# Patient Record
Sex: FEMALE | Race: WHITE | NOT HISPANIC OR LATINO | Employment: UNEMPLOYED | URBAN - NONMETROPOLITAN AREA
[De-identification: names, ages, dates, MRNs, and addresses within clinical notes are randomized per-mention and may not be internally consistent; named-entity substitution may affect disease eponyms.]

---

## 2022-03-30 ENCOUNTER — INITIAL PRENATAL (OUTPATIENT)
Dept: OBSTETRICS AND GYNECOLOGY | Facility: CLINIC | Age: 22
End: 2022-03-30

## 2022-03-30 ENCOUNTER — LAB (OUTPATIENT)
Dept: LAB | Facility: HOSPITAL | Age: 22
End: 2022-03-30

## 2022-03-30 VITALS
DIASTOLIC BLOOD PRESSURE: 88 MMHG | HEIGHT: 68 IN | BODY MASS INDEX: 32.43 KG/M2 | SYSTOLIC BLOOD PRESSURE: 140 MMHG | WEIGHT: 214 LBS

## 2022-03-30 DIAGNOSIS — O09.299 HISTORY OF MISCARRIAGE, CURRENTLY PREGNANT, UNSPECIFIED TRIMESTER: Primary | ICD-10-CM

## 2022-03-30 DIAGNOSIS — E66.9 OBESITY (BMI 30-39.9): ICD-10-CM

## 2022-03-30 DIAGNOSIS — Z32.00 PREGNANCY EXAMINATION OR TEST, PREGNANCY UNCONFIRMED: ICD-10-CM

## 2022-03-30 LAB
ABO GROUP BLD: NORMAL
AMPHET+METHAMPHET UR QL: NEGATIVE
AMPHETAMINES UR QL: NEGATIVE
B-HCG UR QL: POSITIVE
BARBITURATES UR QL SCN: NEGATIVE
BASOPHILS # BLD AUTO: 0.02 10*3/MM3 (ref 0–0.2)
BASOPHILS NFR BLD AUTO: 0.2 % (ref 0–1.5)
BENZODIAZ UR QL SCN: NEGATIVE
BILIRUB UR QL STRIP: NEGATIVE
BLD GP AB SCN SERPL QL: NEGATIVE
BUPRENORPHINE SERPL-MCNC: NEGATIVE NG/ML
CANNABINOIDS SERPL QL: NEGATIVE
CLARITY UR: CLEAR
COCAINE UR QL: NEGATIVE
COLOR UR: YELLOW
DEPRECATED RDW RBC AUTO: 39.8 FL (ref 37–54)
EOSINOPHIL # BLD AUTO: 0.18 10*3/MM3 (ref 0–0.4)
EOSINOPHIL NFR BLD AUTO: 1.8 % (ref 0.3–6.2)
ERYTHROCYTE [DISTWIDTH] IN BLOOD BY AUTOMATED COUNT: 12.9 % (ref 12.3–15.4)
EXPIRATION DATE: ABNORMAL
GLUCOSE UR STRIP-MCNC: NEGATIVE MG/DL
HBV SURFACE AG SERPL QL IA: NORMAL
HCG INTACT+B SERPL-ACNC: 2741 MIU/ML
HCT VFR BLD AUTO: 40.3 % (ref 34–46.6)
HCV AB SER DONR QL: NORMAL
HGB BLD-MCNC: 14 G/DL (ref 12–15.9)
HGB UR QL STRIP.AUTO: NEGATIVE
HIV1+2 AB SER QL: NORMAL
IMM GRANULOCYTES # BLD AUTO: 0.02 10*3/MM3 (ref 0–0.05)
IMM GRANULOCYTES NFR BLD AUTO: 0.2 % (ref 0–0.5)
INTERNAL NEGATIVE CONTROL: NEGATIVE
INTERNAL POSITIVE CONTROL: POSITIVE
KETONES UR QL STRIP: NEGATIVE
LEUKOCYTE ESTERASE UR QL STRIP.AUTO: ABNORMAL
LYMPHOCYTES # BLD AUTO: 2.43 10*3/MM3 (ref 0.7–3.1)
LYMPHOCYTES NFR BLD AUTO: 24.6 % (ref 19.6–45.3)
Lab: ABNORMAL
Lab: NORMAL
MCH RBC QN AUTO: 29.9 PG (ref 26.6–33)
MCHC RBC AUTO-ENTMCNC: 34.7 G/DL (ref 31.5–35.7)
MCV RBC AUTO: 85.9 FL (ref 79–97)
METHADONE UR QL SCN: NEGATIVE
MONOCYTES # BLD AUTO: 0.64 10*3/MM3 (ref 0.1–0.9)
MONOCYTES NFR BLD AUTO: 6.5 % (ref 5–12)
NEUTROPHILS NFR BLD AUTO: 6.58 10*3/MM3 (ref 1.7–7)
NEUTROPHILS NFR BLD AUTO: 66.7 % (ref 42.7–76)
NITRITE UR QL STRIP: NEGATIVE
NRBC BLD AUTO-RTO: 0 /100 WBC (ref 0–0.2)
OPIATES UR QL: NEGATIVE
OXYCODONE UR QL SCN: NEGATIVE
PCP UR QL SCN: NEGATIVE
PH UR STRIP.AUTO: 7.5 [PH] (ref 5–8)
PLATELET # BLD AUTO: 296 10*3/MM3 (ref 140–450)
PMV BLD AUTO: 10.7 FL (ref 6–12)
PROPOXYPH UR QL: NEGATIVE
PROT UR QL STRIP: NEGATIVE
RBC # BLD AUTO: 4.69 10*6/MM3 (ref 3.77–5.28)
RH BLD: POSITIVE
RPR SER QL: NORMAL
SP GR UR STRIP: 1.01 (ref 1–1.03)
TRICYCLICS UR QL SCN: NEGATIVE
UROBILINOGEN UR QL STRIP: ABNORMAL
WBC NRBC COR # BLD: 9.87 10*3/MM3 (ref 3.4–10.8)

## 2022-03-30 PROCEDURE — 87591 N.GONORRHOEAE DNA AMP PROB: CPT | Performed by: NURSE PRACTITIONER

## 2022-03-30 PROCEDURE — 87661 TRICHOMONAS VAGINALIS AMPLIF: CPT | Performed by: NURSE PRACTITIONER

## 2022-03-30 PROCEDURE — 81025 URINE PREGNANCY TEST: CPT | Performed by: NURSE PRACTITIONER

## 2022-03-30 PROCEDURE — 86803 HEPATITIS C AB TEST: CPT | Performed by: NURSE PRACTITIONER

## 2022-03-30 PROCEDURE — 87491 CHLMYD TRACH DNA AMP PROBE: CPT | Performed by: NURSE PRACTITIONER

## 2022-03-30 PROCEDURE — 87086 URINE CULTURE/COLONY COUNT: CPT | Performed by: NURSE PRACTITIONER

## 2022-03-30 PROCEDURE — 80081 OBSTETRIC PANEL INC HIV TSTG: CPT | Performed by: NURSE PRACTITIONER

## 2022-03-30 PROCEDURE — 36415 COLL VENOUS BLD VENIPUNCTURE: CPT

## 2022-03-30 PROCEDURE — 80306 DRUG TEST PRSMV INSTRMNT: CPT | Performed by: NURSE PRACTITIONER

## 2022-03-30 PROCEDURE — 81003 URINALYSIS AUTO W/O SCOPE: CPT | Performed by: NURSE PRACTITIONER

## 2022-03-30 PROCEDURE — 84702 CHORIONIC GONADOTROPIN TEST: CPT | Performed by: NURSE PRACTITIONER

## 2022-03-30 NOTE — PROGRESS NOTES
"I spent approximately 60 minutes with the patient acquiring the health and history intake, reviewing prior records, discussing topics related to healthy pregnancy, entering orders, and documentation. Her UPT in office today is positive. Her LMP is approximately 2/6/22. This is her 3rd pregnancy. She had a miscarriage in July 2021 at about 7 weeks gestation. She had another miscarriage in November 2021 at about 6 weeks gestation. She was seen for both pregnancies at [facility] in [place]. The patient signed a release because she wants us to have her records from [facility].   She has history of anxiety, but she is not on medication. She is nervous today because of her history of miscarriages. She filled out the depression screening questionnaire and scored 12. However, when I talked to the patient on the phone, she said she did not answer the questions for how she felt for the past 7 days. She answered the questions as \"had she ever had those feelings\". She will do another depression screening questionnaire at her next appointment. She has Factor V Leiden. Her mother and MGF also has Factor V. Her sister was born with a club foot.   A newob bag is given. The 1st trimester teaching was done with the patient. We discussed a healthy diet and exercise and what is recommended. She plans to buy prenatal vitamins OTC. We also discussed Listeriosis and Toxoplasmosis and what fish to avoid due to high mercury levels. I informed patient not to be in hot tubs, saunas, or tanning beds. We discussed that spotting may occur after intercourse which is common, but if heavy bleeding like a period occurs to [facility] or [facility] if clinic is closed.  I encouraged her to make an appointment with the dentist if she has not had a dental exam and cleaning in the last 6 months. The patient denies use of alcohol, illicit drug use, and tobacco smoking. She plans to breastfeed.  I gave her pamphlet on breastfeeding classes and " the breastfeeding mothers support group. These services are provided by Krys Berger, Lactation Consultant. I encouraged the patient to get the TDAP vaccine in the 3rd trimester. She tells me that she did not receive childhood vaccinations. Her mother did not have her children vaccinated. I discussed with the patient that a pediatrician needs to be chosen prior to delivery for the infant to have an appointment scheduled before leaving the hospital.  I discussed lab tests will be done today. All questions were answered at this time. Due to her history of the 2 miscarriages, the patient wants to wait until about 12 weeks gestation for her to get an ultrasound, so that she can do the ultrasound transabdominally. She says I know they told me that the ultrasounds did not cause the miscarriages, but I am still scared to do it and would like to wait. I spoke to the OB provider who said to make her an appointment with an OB provider in about 2 weeks and an ultrasound will be done at about 12 weeks gestation. The patient wishes to see Dr. Adams in [place]. She is scheduled to see Dr. Adams on 4/14/22.

## 2022-03-31 LAB
BACTERIA SPEC AEROBE CULT: NO GROWTH
C TRACH RRNA CVX QL NAA+PROBE: NEGATIVE
N GONORRHOEA RRNA SPEC QL NAA+PROBE: NEGATIVE
RUBV IGG SERPL IA-ACNC: 5.13 INDEX
TRICHOMONAS VAGINALIS PCR: NEGATIVE

## 2022-04-14 ENCOUNTER — LAB (OUTPATIENT)
Dept: LAB | Facility: OTHER | Age: 22
End: 2022-04-14

## 2022-04-14 ENCOUNTER — REFERRAL TRIAGE (OUTPATIENT)
Dept: LABOR AND DELIVERY | Facility: HOSPITAL | Age: 22
End: 2022-04-14

## 2022-04-14 ENCOUNTER — INITIAL PRENATAL (OUTPATIENT)
Dept: OBSTETRICS AND GYNECOLOGY | Facility: CLINIC | Age: 22
End: 2022-04-14

## 2022-04-14 DIAGNOSIS — Z15.89 MTHFR MUTATION: ICD-10-CM

## 2022-04-14 DIAGNOSIS — O26.20 PREGNANCY AFFECTED BY PREVIOUS RECURRENT MISCARRIAGES, ANTEPARTUM: ICD-10-CM

## 2022-04-14 DIAGNOSIS — O20.9 BLEEDING IN EARLY PREGNANCY: ICD-10-CM

## 2022-04-14 DIAGNOSIS — O20.9 BLEEDING IN EARLY PREGNANCY: Primary | ICD-10-CM

## 2022-04-14 DIAGNOSIS — N96 HISTORY OF RECURRENT MISCARRIAGES: ICD-10-CM

## 2022-04-14 LAB — HCG INTACT+B SERPL-ACNC: 416.6 MIU/ML

## 2022-04-14 PROCEDURE — 36415 COLL VENOUS BLD VENIPUNCTURE: CPT | Performed by: OBSTETRICS & GYNECOLOGY

## 2022-04-14 PROCEDURE — 0501F PRENATAL FLOW SHEET: CPT | Performed by: OBSTETRICS & GYNECOLOGY

## 2022-04-14 PROCEDURE — 84702 CHORIONIC GONADOTROPIN TEST: CPT | Performed by: OBSTETRICS & GYNECOLOGY

## 2022-04-14 NOTE — PROGRESS NOTES
[facility]  Obstetrics  Date of Service: 2022    CHIEF COMPLAINT:  New prenatal visit    HISTORY OF PRESENT ILLNESS:  Marsha Damon is a 22 y.o. y/o  at 9w4d by LMP (Patient's last menstrual period was 2022 (approximate).).  She presents with bleeding that started on  while she was on vacation in [place]; the first few days she had heavy clots and felt like she had a miscarriage.  She did not have an early ultrasound because of her history of miscarriage.  She is no longer bleeding.  Denies breast tenderness or nausea.    She has had 2 prior miscarriages and this felt very similar to that.  She states that the first occurred the evening of her 1st ultrasound and the 2nd miscarriage occurred the night after an ultrasound that showed a heartbeat.  She was never been worked up for preventable causes of miscarriage.    REVIEW OF SYSTEMS  Review of Systems   Constitutional: Negative.    HENT: Negative.    Eyes: Negative.    Respiratory: Negative.    Cardiovascular: Negative.    Gastrointestinal: Negative.    Endocrine: Negative.    Genitourinary: Negative.    Musculoskeletal: Negative.    Allergic/Immunologic: Negative.    Neurological: Negative.    Hematological: Negative.    Psychiatric/Behavioral: Negative.      OBSTETRIC HISTORY:  OB History    Para Term  AB Living   3       2     SAB IAB Ectopic Molar Multiple Live Births   2                # Outcome Date GA Lbr Larry/2nd Weight Sex Delivery Anes PTL Lv   3 Current            2 SAB 2021 6w0d          1 2021 7w0d            GYN HISTORY:  Menarche: age 14  Denies h/o sexually transmitted infections/pelvic inflammatory disease  Denies h/o gynecologic surgeries, including biopsies of the cervix    PAST MEDICAL HISTORY:  Past Medical History:   Diagnosis Date   • Anxiety    • Factor V Leiden (HCC)      PAST SURGICAL HISTORY:  History reviewed. No pertinent surgical history.    FAMILY HISTORY:  Family  History   Problem Relation Age of Onset   • Hypertension Mother    • Factor V Leiden deficiency Mother    • No Known Problems Brother    • Cancer Maternal Grandmother    • Factor V Leiden deficiency Maternal Grandfather    • Cancer Maternal Grandfather    • Skin cancer Paternal Grandfather      SOCIAL HISTORY:  Social History     Socioeconomic History   • Marital status:    Tobacco Use   • Smoking status: Never Smoker   • Smokeless tobacco: Never Used   Vaping Use   • Vaping Use: Former   Substance and Sexual Activity   • Alcohol use: Never   • Drug use: Never   • Sexual activity: Yes     Partners: Male     GENETIC SCREENING:  Age >36 yo as of DARIO: no  Thalassemia: no  NTD: no  CHD: no  Down Syndrome/MR/Fragile X/Autism: no  Ashkenazi Amish with Angel-Sachs, [place], familial dysautonomia: no  Sickle cell disease or trait: no  Hemophilia: no  Muscular dystrophy: no  Cystic fibrosis: no  [place]'s chorea: no  Birth defects: no  Genetic/chromosomal disorders: no    INFECTION HISTORY:  TB exposure: no  HSV: no  Illness since LMP: no  Prior GBS infected child: N/A  STIs: no    ALLERGIES:  No Known Allergies    MEDICATIONS:  Prior to Admission medications    Not on File     PHYSICAL EXAM:   /76   Wt 96.5 kg (212 lb 12.8 oz)   LMP 02/06/2022 (Approximate)   BMI 32.36 kg/m²   General: Alert, healthy, no distress, well nourished and well developed.  Neurologic: Alert, oriented to person, place, and time.  Gait normal.  Cranial nerves II-XII grossly intact.  HEENT: Normocephalic, atraumatic.  Extraocular muscles intact, pupils equal and reactive x2.    Teeth: Normal hygiene.  Neck: Supple, no adenopathy, thyroid normal size, non-tender, without nodularity, trachea midline.  Lungs: Normal respiratory effort.  Clear to auscultation bilaterally.  No wheezes, rhonci, or rales.  Heart: Regular rate and rhythm.  No murmer, rub or gallop.  Abdomen: Soft, non-tender, non-distended,no masses, no hepatosplenomegaly,  no hernia.  Skin: No rash, no lesions.  Extremities: No cyanosis, clubbing or edema.    IMPRESSION:  Marsha Damon is a 22 y.o.  at 9w4d with suspected miscarriage    PLAN:  - Will obtain beta-hCG today   - I had a long discussion with her regarding her prior miscarriages.  I reassured her that early miscarriages are typically linked to genetic abnormalities (trisomies) that are not conducive for life which is why the body will miscarry.  I also discussed that there are acquired thrombophilia disorders (such as antiphospholipid antibody syndrome) or structural defects of the uterus that could cause recurrent miscarriages.  Will work-up after monitoring for miscarriage and following labs.     Diagnosis Plan   1. Bleeding in early pregnancy  hCG, Quantitative, Pregnancy   2. Pregnancy affected by previous recurrent miscarriages, antepartum       Sara Adams MD  2022  13:59 CDT

## 2022-04-22 ENCOUNTER — DOCUMENTATION (OUTPATIENT)
Dept: OBSTETRICS AND GYNECOLOGY | Facility: CLINIC | Age: 22
End: 2022-04-22

## 2022-04-22 NOTE — PROGRESS NOTES
Received outside records.    12/16/2020: Factor V Leiden heterozygous mutation [id number]  12/2021:  Anticardiolipin Ab IgG negative  Anticardiolipin IgM negative  Anticardiolipin IgA negative

## 2022-04-25 DIAGNOSIS — N96 HISTORY OF RECURRENT MISCARRIAGES: Primary | ICD-10-CM

## 2022-04-27 ENCOUNTER — LAB (OUTPATIENT)
Dept: LAB | Facility: OTHER | Age: 22
End: 2022-04-27

## 2022-04-27 DIAGNOSIS — N96 HISTORY OF RECURRENT MISCARRIAGES: ICD-10-CM

## 2022-04-27 LAB
HBA1C MFR BLD: 4.7 % (ref 4.8–5.6)
HCG INTACT+B SERPL-ACNC: 1.28 MIU/ML
TSH SERPL DL<=0.05 MIU/L-ACNC: 1.41 UIU/ML (ref 0.27–4.2)

## 2022-04-27 PROCEDURE — 85732 THROMBOPLASTIN TIME PARTIAL: CPT | Performed by: OBSTETRICS & GYNECOLOGY

## 2022-04-27 PROCEDURE — 84443 ASSAY THYROID STIM HORMONE: CPT | Performed by: OBSTETRICS & GYNECOLOGY

## 2022-04-27 PROCEDURE — 86146 BETA-2 GLYCOPROTEIN ANTIBODY: CPT | Performed by: OBSTETRICS & GYNECOLOGY

## 2022-04-27 PROCEDURE — 86147 CARDIOLIPIN ANTIBODY EA IG: CPT | Performed by: OBSTETRICS & GYNECOLOGY

## 2022-04-27 PROCEDURE — 85598 HEXAGNAL PHOSPH PLTLT NEUTRL: CPT | Performed by: OBSTETRICS & GYNECOLOGY

## 2022-04-27 PROCEDURE — 85613 RUSSELL VIPER VENOM DILUTED: CPT | Performed by: OBSTETRICS & GYNECOLOGY

## 2022-04-27 PROCEDURE — 84702 CHORIONIC GONADOTROPIN TEST: CPT | Performed by: OBSTETRICS & GYNECOLOGY

## 2022-04-27 PROCEDURE — 85597 PHOSPHOLIPID PLTLT NEUTRALIZ: CPT | Performed by: OBSTETRICS & GYNECOLOGY

## 2022-04-27 PROCEDURE — 85610 PROTHROMBIN TIME: CPT | Performed by: OBSTETRICS & GYNECOLOGY

## 2022-04-27 PROCEDURE — 81291 MTHFR GENE: CPT | Performed by: OBSTETRICS & GYNECOLOGY

## 2022-04-27 PROCEDURE — 85670 THROMBIN TIME PLASMA: CPT | Performed by: OBSTETRICS & GYNECOLOGY

## 2022-04-27 PROCEDURE — 85730 THROMBOPLASTIN TIME PARTIAL: CPT | Performed by: OBSTETRICS & GYNECOLOGY

## 2022-04-27 PROCEDURE — 36415 COLL VENOUS BLD VENIPUNCTURE: CPT | Performed by: OBSTETRICS & GYNECOLOGY

## 2022-04-27 PROCEDURE — 83036 HEMOGLOBIN GLYCOSYLATED A1C: CPT | Performed by: OBSTETRICS & GYNECOLOGY

## 2022-05-06 LAB — MTHFR GENE MUT ANL BLD/T: NORMAL

## 2022-05-09 VITALS — SYSTOLIC BLOOD PRESSURE: 134 MMHG | WEIGHT: 212.8 LBS | DIASTOLIC BLOOD PRESSURE: 76 MMHG | BODY MASS INDEX: 32.36 KG/M2

## 2022-05-09 RX ORDER — LANOLIN ALCOHOL/MO/W.PET/CERES
400 CREAM (GRAM) TOPICAL DAILY
Qty: 30 TABLET | Refills: 11 | Status: SHIPPED | OUTPATIENT
Start: 2022-05-09 | End: 2022-07-26 | Stop reason: HOSPADM

## 2022-05-09 RX ORDER — ASPIRIN 81 MG/1
81 TABLET ORAL DAILY
Qty: 30 TABLET | Refills: 11 | Status: SHIPPED | OUTPATIENT
Start: 2022-05-09 | End: 2023-03-11 | Stop reason: HOSPADM

## 2022-05-20 LAB
APTT HEX PL PPP: 4 SEC
APTT IMM NP PPP: NORMAL S
APTT PPP 1:1 SALINE: NORMAL S
APTT PPP: 29 SEC
B2 GLYCOPROT1 IGA SER-ACNC: <10 SAU
B2 GLYCOPROT1 IGG SER-ACNC: <10 SGU
B2 GLYCOPROT1 IGM SER-ACNC: <10 SMU
CARDIOLIPIN IGG SER IA-ACNC: <10 GPL
CARDIOLIPIN IGM SER IA-ACNC: <10 MPL
CONFIRM APTT: 3 SEC
CONFIRM DRVVT: NORMAL S
DRVVT SCREEN TO CONFIRM RATIO: NORMAL {RATIO}
INR PPP: 1 RATIO
LABORATORY COMMENT REPORT: NORMAL
PROTHROMBIN TIME: 10 SEC
SCREEN DRVVT: 41.7 SEC
THROMBIN TIME: 17 SEC

## 2022-05-23 ENCOUNTER — TELEPHONE (OUTPATIENT)
Dept: OBSTETRICS AND GYNECOLOGY | Facility: CLINIC | Age: 22
End: 2022-05-23

## 2022-05-23 NOTE — TELEPHONE ENCOUNTER
PATIENT CALLED AND WAS WONDERING WHEN SHE NEEDED TO COME IN FOR A US SINCE SHE HAD HER LABS DONE. SHE WAS TOLD TO CALL DR ZHAO TO LET HER KNOW. HER NUMBER -[phone].        THANK YOU,      JYOTI

## 2022-05-26 ENCOUNTER — TELEPHONE (OUTPATIENT)
Dept: OBSTETRICS AND GYNECOLOGY | Facility: CLINIC | Age: 22
End: 2022-05-26

## 2022-05-26 NOTE — TELEPHONE ENCOUNTER
----- Message from Sara Adams MD sent at 5/26/2022  9:44 AM CDT -----  Please let her know that her thyroid and HbA1c are normal. Her testing for antiphospholipid antibody syndrome was negative (and was tested previously and also negative).  Agree that she does need an US to evaluate uterus.

## 2022-05-27 ENCOUNTER — TELEPHONE (OUTPATIENT)
Dept: OBSTETRICS AND GYNECOLOGY | Facility: CLINIC | Age: 22
End: 2022-05-27

## 2022-05-27 DIAGNOSIS — N96 HISTORY OF RECURRENT MISCARRIAGES: Primary | ICD-10-CM

## 2022-05-27 NOTE — TELEPHONE ENCOUNTER
----- Message from aSra Adams MD sent at 5/26/2022  9:44 AM CDT -----  Please let her know that her thyroid and HbA1c are normal. Her testing for antiphospholipid antibody syndrome was negative (and was tested previously and also negative).  Agree that she does need an US to evaluate uterus.

## 2022-06-13 ENCOUNTER — TELEPHONE (OUTPATIENT)
Dept: OBSTETRICS AND GYNECOLOGY | Facility: CLINIC | Age: 22
End: 2022-06-13

## 2022-06-13 NOTE — TELEPHONE ENCOUNTER
----- Message from Sara Adams MD sent at 6/10/2022 11:46 AM CDT -----  Please let her know that her US is normal. May try taking a baby aspirin 81 mg daily to see if that helps with miscarriage prevention; this is anecdotal evidence but may be helpful in some cases.

## 2022-06-13 NOTE — TELEPHONE ENCOUNTER
Called and spoke with patient, notified of results and dr ball recommendations. Patient verbalized understanding and did not have any questions or concerns.

## 2022-07-26 ENCOUNTER — LAB (OUTPATIENT)
Dept: LAB | Facility: HOSPITAL | Age: 22
End: 2022-07-26

## 2022-07-26 ENCOUNTER — CLINICAL SUPPORT (OUTPATIENT)
Dept: OBSTETRICS AND GYNECOLOGY | Facility: CLINIC | Age: 22
End: 2022-07-26

## 2022-07-26 ENCOUNTER — TELEPHONE (OUTPATIENT)
Dept: OBSTETRICS AND GYNECOLOGY | Facility: CLINIC | Age: 22
End: 2022-07-26

## 2022-07-26 VITALS
DIASTOLIC BLOOD PRESSURE: 76 MMHG | WEIGHT: 224.4 LBS | HEIGHT: 68 IN | BODY MASS INDEX: 34.01 KG/M2 | SYSTOLIC BLOOD PRESSURE: 138 MMHG

## 2022-07-26 DIAGNOSIS — N92.6 MISSED PERIOD: Primary | ICD-10-CM

## 2022-07-26 DIAGNOSIS — N92.6 MISSED PERIOD: ICD-10-CM

## 2022-07-26 LAB
B-HCG UR QL: NEGATIVE
EXPIRATION DATE: NORMAL
HCG INTACT+B SERPL-ACNC: 388.7 MIU/ML
INTERNAL NEGATIVE CONTROL: NEGATIVE
INTERNAL POSITIVE CONTROL: POSITIVE
Lab: NORMAL

## 2022-07-26 PROCEDURE — 36415 COLL VENOUS BLD VENIPUNCTURE: CPT

## 2022-07-26 PROCEDURE — 84702 CHORIONIC GONADOTROPIN TEST: CPT

## 2022-07-26 PROCEDURE — 81025 URINE PREGNANCY TEST: CPT | Performed by: OBSTETRICS & GYNECOLOGY

## 2022-07-26 RX ORDER — PRENATAL VIT NO.126/IRON/FOLIC 28MG-0.8MG
TABLET ORAL DAILY
COMMUNITY

## 2022-07-26 NOTE — TELEPHONE ENCOUNTER
Called and spoke with patient. Notified of results. Patient verbalized understanding and states she will have her labs drawn in powderly.

## 2022-07-26 NOTE — PROGRESS NOTES
The patient comes in today for her first prenatal appointment. She had two positive home pregnancy tests. Her LMP is approximately 6/22/22. The UPT in the office today is negative. I told her that we can do a beta HCG quant level and call her with results. She is agreeable with this. The patient will get labs drawn and wait for us to call.

## 2022-07-26 NOTE — TELEPHONE ENCOUNTER
----- Message from Sara Adams MD sent at 7/26/2022 11:11 AM CDT -----  Please call and let her know that her beta-hCG is 388!!! Recommend repeat in 48 hours.

## 2022-07-27 ENCOUNTER — TELEPHONE (OUTPATIENT)
Dept: OBSTETRICS AND GYNECOLOGY | Facility: CLINIC | Age: 22
End: 2022-07-27

## 2022-07-27 NOTE — TELEPHONE ENCOUNTER
PT stated she is to have an HCG lab tomorrow, but there are no orders. She also requested for her Progesterone to be tested as well.

## 2022-07-28 ENCOUNTER — TELEPHONE (OUTPATIENT)
Dept: OBSTETRICS AND GYNECOLOGY | Facility: CLINIC | Age: 22
End: 2022-07-28

## 2022-07-28 ENCOUNTER — LAB (OUTPATIENT)
Dept: LAB | Facility: OTHER | Age: 22
End: 2022-07-28

## 2022-07-28 DIAGNOSIS — Z87.59 HISTORY OF MISCARRIAGE: ICD-10-CM

## 2022-07-28 DIAGNOSIS — N92.6 MISSED PERIOD: ICD-10-CM

## 2022-07-28 DIAGNOSIS — Z87.59 HISTORY OF MISCARRIAGE: Primary | ICD-10-CM

## 2022-07-28 DIAGNOSIS — N92.6 MISSED PERIOD: Primary | ICD-10-CM

## 2022-07-28 LAB
HCG INTACT+B SERPL-ACNC: 817.2 MIU/ML
PROGEST SERPL-MCNC: 10.9 NG/ML

## 2022-07-28 PROCEDURE — 84144 ASSAY OF PROGESTERONE: CPT | Performed by: OBSTETRICS & GYNECOLOGY

## 2022-07-28 PROCEDURE — 84702 CHORIONIC GONADOTROPIN TEST: CPT | Performed by: OBSTETRICS & GYNECOLOGY

## 2022-07-28 NOTE — TELEPHONE ENCOUNTER
----- Message from Sara Adams MD sent at 7/28/2022 12:48 PM CDT -----  Please let her know that her beta-hCG has doubled! Recommend repeat labs on Monday.

## 2022-07-28 NOTE — TELEPHONE ENCOUNTER
Called and spoke with patient regarding results. Recommend repeat on Monday. Patient verbalized understanding

## 2022-07-29 RX ORDER — PROGESTERONE 200 MG/1
200 CAPSULE ORAL DAILY
Qty: 30 CAPSULE | Refills: 2 | Status: SHIPPED | OUTPATIENT
Start: 2022-07-29 | End: 2022-08-15 | Stop reason: SDUPTHER

## 2022-08-01 ENCOUNTER — LAB (OUTPATIENT)
Dept: LAB | Facility: OTHER | Age: 22
End: 2022-08-01

## 2022-08-01 DIAGNOSIS — Z32.01 POSITIVE PREGNANCY TEST: ICD-10-CM

## 2022-08-01 DIAGNOSIS — E66.9 OBESITY (BMI 30-39.9): ICD-10-CM

## 2022-08-01 DIAGNOSIS — Z32.00 PREGNANCY EXAMINATION OR TEST, PREGNANCY UNCONFIRMED: Primary | ICD-10-CM

## 2022-08-01 DIAGNOSIS — O09.299 HISTORY OF MISCARRIAGE, CURRENTLY PREGNANT, UNSPECIFIED TRIMESTER: ICD-10-CM

## 2022-08-01 DIAGNOSIS — Z87.59 HISTORY OF MISCARRIAGE: ICD-10-CM

## 2022-08-01 LAB — HCG INTACT+B SERPL-ACNC: 2834 MIU/ML

## 2022-08-01 PROCEDURE — 84702 CHORIONIC GONADOTROPIN TEST: CPT | Performed by: OBSTETRICS & GYNECOLOGY

## 2022-08-01 PROCEDURE — 84144 ASSAY OF PROGESTERONE: CPT | Performed by: OBSTETRICS & GYNECOLOGY

## 2022-08-02 ENCOUNTER — TELEPHONE (OUTPATIENT)
Dept: OBSTETRICS AND GYNECOLOGY | Facility: CLINIC | Age: 22
End: 2022-08-02

## 2022-08-02 DIAGNOSIS — N96 HISTORY OF RECURRENT MISCARRIAGES: ICD-10-CM

## 2022-08-02 DIAGNOSIS — Z32.01 POSITIVE PREGNANCY TEST: Primary | ICD-10-CM

## 2022-08-02 LAB — PROGEST SERPL-MCNC: 14.9 NG/ML

## 2022-08-02 NOTE — TELEPHONE ENCOUNTER
----- Message from Hilda Hinds MA sent at 8/2/2022  9:21 AM CDT -----    ----- Message -----  From: Sara Adams MD  Sent: 8/2/2022   7:27 AM CDT  To: Kusum Mitchell, MOHIT    Please let her know that her beta-hCG is rising appropriately! Have her double her progesterone and get scheduled for an US in 2 weeks.

## 2022-08-15 DIAGNOSIS — Z87.59 HISTORY OF MISCARRIAGE: ICD-10-CM

## 2022-08-15 RX ORDER — PROGESTERONE 200 MG/1
200 CAPSULE ORAL 2 TIMES DAILY
Qty: 60 CAPSULE | Refills: 2 | Status: SHIPPED | OUTPATIENT
Start: 2022-08-15 | End: 2022-11-10

## 2022-09-01 ENCOUNTER — TELEPHONE (OUTPATIENT)
Dept: OBSTETRICS AND GYNECOLOGY | Facility: CLINIC | Age: 22
End: 2022-09-01

## 2022-09-01 NOTE — TELEPHONE ENCOUNTER
ATTEMPTED TO CALL THE PT.  PHONE MSG SAYS NOT AVAILABLE AT THIS TIME.  IF PT CALLS BACK IT IS OK TO LET HER KNOW THAT HER U/S WAS GREAT AND WE NEED TO SCHEDULE HER FOR A NEW OB APPT.  PAVEL VICTOR.

## 2022-09-02 ENCOUNTER — TELEPHONE (OUTPATIENT)
Dept: OBSTETRICS AND GYNECOLOGY | Facility: CLINIC | Age: 22
End: 2022-09-02

## 2022-09-02 NOTE — TELEPHONE ENCOUNTER
Attempted to contact patient to set up new ob appointment patient has no voicemail box set up.  
[At Term] : at term

## 2022-09-15 ENCOUNTER — LAB (OUTPATIENT)
Dept: LAB | Facility: HOSPITAL | Age: 22
End: 2022-09-15

## 2022-09-15 ENCOUNTER — INITIAL PRENATAL (OUTPATIENT)
Dept: OBSTETRICS AND GYNECOLOGY | Facility: CLINIC | Age: 22
End: 2022-09-15

## 2022-09-15 VITALS — BODY MASS INDEX: 34.42 KG/M2 | DIASTOLIC BLOOD PRESSURE: 76 MMHG | WEIGHT: 226.4 LBS | SYSTOLIC BLOOD PRESSURE: 124 MMHG

## 2022-09-15 VITALS — DIASTOLIC BLOOD PRESSURE: 76 MMHG | SYSTOLIC BLOOD PRESSURE: 124 MMHG | WEIGHT: 226 LBS | BODY MASS INDEX: 34.36 KG/M2

## 2022-09-15 DIAGNOSIS — D68.51 HETEROZYGOUS FACTOR V LEIDEN AFFECTING PREGNANCY IN FIRST TRIMESTER, ANTEPARTUM: Primary | ICD-10-CM

## 2022-09-15 DIAGNOSIS — Z34.01 PRIMIGRAVIDA IN FIRST TRIMESTER: ICD-10-CM

## 2022-09-15 DIAGNOSIS — O09.91 HIGH-RISK PREGNANCY IN FIRST TRIMESTER: ICD-10-CM

## 2022-09-15 DIAGNOSIS — Z82.79 FAMILY HISTORY OF BIRTH DEFECT: ICD-10-CM

## 2022-09-15 DIAGNOSIS — O09.291 HISTORY OF MISCARRIAGE, CURRENTLY PREGNANT, FIRST TRIMESTER: Primary | ICD-10-CM

## 2022-09-15 DIAGNOSIS — O99.111 HETEROZYGOUS FACTOR V LEIDEN AFFECTING PREGNANCY IN FIRST TRIMESTER, ANTEPARTUM: Primary | ICD-10-CM

## 2022-09-15 DIAGNOSIS — Z3A.11 11 WEEKS GESTATION OF PREGNANCY: ICD-10-CM

## 2022-09-15 LAB
ABO GROUP BLD: NORMAL
AMPHET+METHAMPHET UR QL: NEGATIVE
AMPHETAMINES UR QL: NEGATIVE
BARBITURATES UR QL SCN: NEGATIVE
BASOPHILS # BLD AUTO: 0.02 10*3/MM3 (ref 0–0.2)
BASOPHILS NFR BLD AUTO: 0.2 % (ref 0–1.5)
BENZODIAZ UR QL SCN: NEGATIVE
BILIRUB UR QL STRIP: NEGATIVE
BLD GP AB SCN SERPL QL: NEGATIVE
BUPRENORPHINE SERPL-MCNC: NEGATIVE NG/ML
CANNABINOIDS SERPL QL: NEGATIVE
CLARITY UR: ABNORMAL
COCAINE UR QL: NEGATIVE
COLOR UR: YELLOW
DEPRECATED RDW RBC AUTO: 42.5 FL (ref 37–54)
EOSINOPHIL # BLD AUTO: 0.19 10*3/MM3 (ref 0–0.4)
EOSINOPHIL NFR BLD AUTO: 2.3 % (ref 0.3–6.2)
ERYTHROCYTE [DISTWIDTH] IN BLOOD BY AUTOMATED COUNT: 13.1 % (ref 12.3–15.4)
GLUCOSE UR STRIP-MCNC: NEGATIVE MG/DL
HBV SURFACE AG SERPL QL IA: NORMAL
HCT VFR BLD AUTO: 38.9 % (ref 34–46.6)
HCV AB SER DONR QL: NORMAL
HGB BLD-MCNC: 13.5 G/DL (ref 12–15.9)
HGB UR QL STRIP.AUTO: NEGATIVE
HIV1+2 AB SER QL: NORMAL
IMM GRANULOCYTES # BLD AUTO: 0.03 10*3/MM3 (ref 0–0.05)
IMM GRANULOCYTES NFR BLD AUTO: 0.4 % (ref 0–0.5)
KETONES UR QL STRIP: NEGATIVE
LEUKOCYTE ESTERASE UR QL STRIP.AUTO: ABNORMAL
LYMPHOCYTES # BLD AUTO: 1.91 10*3/MM3 (ref 0.7–3.1)
LYMPHOCYTES NFR BLD AUTO: 22.6 % (ref 19.6–45.3)
Lab: NORMAL
MCH RBC QN AUTO: 31 PG (ref 26.6–33)
MCHC RBC AUTO-ENTMCNC: 34.7 G/DL (ref 31.5–35.7)
MCV RBC AUTO: 89.2 FL (ref 79–97)
METHADONE UR QL SCN: NEGATIVE
MONOCYTES # BLD AUTO: 0.44 10*3/MM3 (ref 0.1–0.9)
MONOCYTES NFR BLD AUTO: 5.2 % (ref 5–12)
NEUTROPHILS NFR BLD AUTO: 5.85 10*3/MM3 (ref 1.7–7)
NEUTROPHILS NFR BLD AUTO: 69.3 % (ref 42.7–76)
NITRITE UR QL STRIP: NEGATIVE
NRBC BLD AUTO-RTO: 0 /100 WBC (ref 0–0.2)
OPIATES UR QL: NEGATIVE
OXYCODONE UR QL SCN: NEGATIVE
PCP UR QL SCN: NEGATIVE
PH UR STRIP.AUTO: 8 [PH] (ref 5–8)
PLATELET # BLD AUTO: 243 10*3/MM3 (ref 140–450)
PMV BLD AUTO: 10.9 FL (ref 6–12)
PROGEST SERPL-MCNC: 22.3 NG/ML
PROPOXYPH UR QL: NEGATIVE
PROT UR QL STRIP: NEGATIVE
RBC # BLD AUTO: 4.36 10*6/MM3 (ref 3.77–5.28)
RH BLD: POSITIVE
RPR SER QL: NORMAL
SP GR UR STRIP: 1.01 (ref 1–1.03)
TRICYCLICS UR QL SCN: NEGATIVE
UROBILINOGEN UR QL STRIP: ABNORMAL
WBC NRBC COR # BLD: 8.44 10*3/MM3 (ref 3.4–10.8)

## 2022-09-15 PROCEDURE — 80081 OBSTETRIC PANEL INC HIV TSTG: CPT | Performed by: NURSE PRACTITIONER

## 2022-09-15 PROCEDURE — 87491 CHLMYD TRACH DNA AMP PROBE: CPT | Performed by: NURSE PRACTITIONER

## 2022-09-15 PROCEDURE — 84144 ASSAY OF PROGESTERONE: CPT | Performed by: NURSE PRACTITIONER

## 2022-09-15 PROCEDURE — 36415 COLL VENOUS BLD VENIPUNCTURE: CPT | Performed by: NURSE PRACTITIONER

## 2022-09-15 PROCEDURE — 87661 TRICHOMONAS VAGINALIS AMPLIF: CPT | Performed by: NURSE PRACTITIONER

## 2022-09-15 PROCEDURE — 87591 N.GONORRHOEAE DNA AMP PROB: CPT | Performed by: NURSE PRACTITIONER

## 2022-09-15 PROCEDURE — 81003 URINALYSIS AUTO W/O SCOPE: CPT | Performed by: NURSE PRACTITIONER

## 2022-09-15 PROCEDURE — 0501F PRENATAL FLOW SHEET: CPT | Performed by: NURSE PRACTITIONER

## 2022-09-15 PROCEDURE — 80306 DRUG TEST PRSMV INSTRMNT: CPT | Performed by: NURSE PRACTITIONER

## 2022-09-15 PROCEDURE — 87086 URINE CULTURE/COLONY COUNT: CPT | Performed by: NURSE PRACTITIONER

## 2022-09-15 PROCEDURE — 86803 HEPATITIS C AB TEST: CPT | Performed by: NURSE PRACTITIONER

## 2022-09-15 NOTE — PROGRESS NOTES
I spent approximately 40 minutes with the patient acquiring the health and history intake, reviewing prior records, discussing topics related to healthy pregnancy, entering orders, and documentation. She is accompanied by her . She has had an ultrasound done on 8/31/22. Her EDC is 4/6/23. This is her 4th pregnancy. She has had 3 miscarriages.   The patient does have Factor V Leiden. She is currently taking Aspirin 81mg. She is also on Progesterone that Dr. Adams prescribed and prenatal vitamins.   A newob bag is given. The 1st trimester teaching was done with the patient. We discussed a healthy diet and exercise and what is recommended. I also discussed Listeriosis and Toxoplasmosis and what fish to avoid due to high mercury levels. I informed patient not to be in hot tubs, saunas, or tanning beds. We discussed that spotting may occur after intercourse which is common, but if heavy bleeding like a period occurs to [facility] or hospital if clinic is closed. The patient currently denies  alcohol, illicit drug use, and tobacco smoking. She has vaped in the past and drank alcohol occasionally prior to finding out she was pregnant. She plans to breastfeed.  She filled out the health department referral form and depression screening questionnaire. She scored 0 on the questionnaire.  I talked to her about education classes. I told her that we are doing in-person classes now. I gave her a schedule of the upcoming classes. I encouraged the patient to get the TDAP vaccine in the 3rd trimester.  I discussed with the patient that a pediatrician needs to be chosen prior to delivery for the infant to have an appointment scheduled before leaving the hospital.  I discussed lab tests will be done today. All questions were answered at this time. She is seeing Samantha CHRISTIANSON today.

## 2022-09-16 LAB
BACTERIA SPEC AEROBE CULT: NORMAL
C TRACH RRNA CVX QL NAA+PROBE: NEGATIVE
N GONORRHOEA RRNA SPEC QL NAA+PROBE: NEGATIVE
RUBV IGG SERPL IA-ACNC: 4.81 INDEX
TRICHOMONAS VAGINALIS PCR: NEGATIVE

## 2022-09-26 ENCOUNTER — TELEPHONE (OUTPATIENT)
Dept: OBSTETRICS AND GYNECOLOGY | Facility: CLINIC | Age: 22
End: 2022-09-26

## 2022-09-26 DIAGNOSIS — R10.9 CRAMPING AFFECTING PREGNANCY, ANTEPARTUM: ICD-10-CM

## 2022-09-26 DIAGNOSIS — N89.8 VAGINAL ITCHING: Primary | ICD-10-CM

## 2022-09-26 DIAGNOSIS — O26.899 CRAMPING AFFECTING PREGNANCY, ANTEPARTUM: ICD-10-CM

## 2022-09-26 NOTE — TELEPHONE ENCOUNTER
Attempted to contact patient at this time there was no answer and no voicemail option orders placed

## 2022-09-26 NOTE — TELEPHONE ENCOUNTER
PT having itching & cramping when using the restroom. She thinks it may be a UTI and requested labs. PT can be reached [phone] once lab orders are in.

## 2022-09-27 PROBLEM — Z34.01 PRIMIGRAVIDA IN FIRST TRIMESTER: Status: ACTIVE | Noted: 2022-09-27

## 2022-09-27 PROBLEM — D68.51 HETEROZYGOUS FACTOR V LEIDEN AFFECTING PREGNANCY IN FIRST TRIMESTER, ANTEPARTUM: Status: ACTIVE | Noted: 2022-09-27

## 2022-09-27 PROBLEM — O99.111 HETEROZYGOUS FACTOR V LEIDEN AFFECTING PREGNANCY IN FIRST TRIMESTER, ANTEPARTUM: Status: ACTIVE | Noted: 2022-09-27

## 2022-09-27 PROBLEM — Z82.79 FAMILY HISTORY OF BIRTH DEFECT: Status: ACTIVE | Noted: 2022-09-27

## 2022-09-27 PROBLEM — O09.91 HIGH-RISK PREGNANCY IN FIRST TRIMESTER: Status: ACTIVE | Noted: 2022-09-27

## 2022-09-27 NOTE — PROGRESS NOTES
[facility]  Obstetrics  Date of Service: 09/15/2022    CHIEF COMPLAINT:  New prenatal visit    HISTORY OF PRESENT ILLNESS:  Marsha Damon is a 22 y.o. y/o  at 11w0d by LMP (Patient's last menstrual period was 2022 (approximate).).  This was a planned pregnancy and the patient is supported by her .  Reports  nausea without vomiting.  Reports breast tenderness.  She denies any vaginal bleeding.  She has  started taking a prenatal vitamin.    REVIEW OF SYSTEMS  Review of Systems   Constitutional: Negative for activity change, appetite change, diaphoresis, fatigue, unexpected weight gain and unexpected weight loss.   Respiratory: Negative for chest tightness and shortness of breath.    Cardiovascular: Negative for chest pain and palpitations.   Gastrointestinal: Positive for nausea. Negative for abdominal distention, abdominal pain, constipation, diarrhea and vomiting.   Genitourinary: Negative for amenorrhea, breast discharge, breast lump, breast pain, decreased libido, dyspareunia, dysuria, menstrual problem, pelvic pain, vaginal bleeding, vaginal discharge and vaginal pain.   Musculoskeletal: Negative for myalgias.   Skin: Negative for color change, dry skin and skin lesions.   Neurological: Negative for light-headedness and headache.   Psychiatric/Behavioral: Negative for agitation, dysphoric mood, sleep disturbance, depressed mood and stress. The patient is not nervous/anxious.        PRENATAL RISK FACTORS   Problems (from 22 to present)     Problem Noted Resolved    Family history of birth defect 2022 by Samantha Harrison APRN No    Overview Signed 2022  1:12 PM by Samantha Harrison APRN     Sister with club foot         High-risk pregnancy in first trimester 2022 by Samantha Harrison APRN No    Primigravida in first trimester 2022 by Samantha Harrison APRN No    Heterozygous factor V Leiden affecting pregnancy in first trimester, antepartum  ([facility]) 2022 by Samantha Harrison APRN No    Overview Signed 2022  1:12 PM by Samantha Harrison APRN     ASA 81mg daily until 35 weeks               DATING CRITERIA:  LMP (~) -- DARIO: 3/29/23   1TUS 22  8w6d 23    OBSTETRIC HISTORY:  OB History    Para Term  AB Living   4       3     SAB IAB Ectopic Molar Multiple Live Births   3                # Outcome Date GA Lbr Larry/2nd Weight Sex Delivery Anes PTL Lv   4 Current            3 SAB 2022           2 SAB 2021 6w0d          1 SAB 2021 7w0d            GYN HISTORY:  Denies h/o sexually transmitted infections/pelvic inflammatory disease  Denies h/o abnormal pap smears  Last pap smear: never 2/2 age  Last Completed Pap Smear     This patient has no relevant Health Maintenance data.        Denies h/o gynecologic surgeries, including biopsies of the cervix    PAST MEDICAL HISTORY:  Past Medical History:   Diagnosis Date   • Anxiety    • Factor V Leiden ([facility])    • Heterozygous MTHFR mutation [id number] 2022   • Ovarian cyst    • Recurrent pregnancy loss, antepartum condition or complication      PAST SURGICAL HISTORY:  No past surgical history on file.  FAMILY HISTORY:  Family History   Problem Relation Age of Onset   • Hypertension Mother    • Factor V Leiden deficiency Mother    • Thyroid disease Father    • No Known Problems Brother    • Cancer Maternal Grandmother    • Factor V Leiden deficiency Maternal Grandfather    • Colon cancer Maternal Grandfather    • No Known Problems Paternal Grandmother    • Skin cancer Paternal Grandfather      SOCIAL HISTORY:  Social History     Socioeconomic History   • Marital status:    Tobacco Use   • Smoking status: Never Smoker   • Smokeless tobacco: Never Used   Vaping Use   • Vaping Use: Former   Substance and Sexual Activity   • Alcohol use: Not Currently   • Drug use: Never   • Sexual activity: Yes     Partners: Male     GENETIC SCREENING:  Age >36 yo as of DARIO: no  Thalassemia:  Heterozygous Factor V leiden  NTD: no  CHD: no  Down Syndrome/MR/Fragile X/Autism: no  Ashkenazi Yarsanism with Angel-Sachs, [place], familial dysautonomia: no  Sickle cell disease or trait: no  Hemophilia: factor V  Muscular dystrophy: no  Cystic fibrosis: no  Donya's chorea: no  Birth defects: sister with club foot  Genetic/chromosomal disorders: no    INFECTION HISTORY:  TB exposure: no  HSV: no  Illness since LMP: no  Prior GBS infected child: no  STIs: no    ALLERGIES:  No Known Allergies    MEDICATIONS:  Prior to Admission medications    Medication Sig Start Date End Date Taking? Authorizing Provider   aspirin (aspirin) 81 MG EC tablet Take 1 tablet by mouth Daily. 22   Sara Adams MD   prenatal vitamin (prenatal, CLASSIC, vitamin) tablet Take  by mouth Daily.    Provider, MD Omar   Progesterone (PROMETRIUM) 200 MG capsule Take 1 capsule by mouth 2 (Two) Times a Day. 8/15/22   Sara Adams MD       PHYSICAL EXAM:   /76   Wt 103 kg (226 lb)   LMP 2022 (Approximate)   BMI 34.36 kg/m²   General: Alert, healthy, no distress, well nourished and well developed.  Neurologic: Alert, oriented to person, place, and time.  Gait normal.  Cranial nerves II-XII grossly intact.  HEENT: Normocephalic, atraumatic.  Extraocular muscles intact, pupils equal and reactive x2.    Teeth: Normal hygiene.  Neck: Supple, no adenopathy, thyroid normal size, non-tender, without nodularity, trachea midline.  Breasts: Deferred  Lungs: Normal respiratory effort.  Clear to auscultation bilaterally.  No wheezes, rhonci, or rales.  Heart: Regular rate and rhythm.  No murmer, rub or gallop.  Abdomen: Soft, non-tender, non-distended,no masses, no hepatosplenomegaly, no hernia.  Skin: No rash, no lesions.  Extremities: No cyanosis, clubbing or edema.  PELVIC EXAM:  Deferred    IMPRESSION:  Marsha Damon is a 22 y.o.  at 11w0d for a new prenatal visit.    PLAN:  1.  IUP at  11w0d  - Options counseling performed and patient desires continuation of pregnancy to term   - Prenatal labs ordered  - Genetic testing, including cystic fibrosis, was discussed and patient declined  - Continue prenatal vitamins  - Weight gain counseling performed.   - Pregravid BMI >30: Recommend 11-20 lb  - Return to clinic in 4 weeks for return prenatal visit  - Reviewed COVID-19 visitation policy  - Reviewed COVID-19 precautions     Diagnosis Plan   1. Heterozygous factor V Leiden affecting pregnancy in first trimester, antepartum (HCC)  Continue ASA 81mg until 36 weeks   2. Primigravida in first trimester     3. High-risk pregnancy in first trimester     4. Family history of birth defect     5. 11 weeks gestation of pregnancy       MEGHAN Beverly  9/27/2022  13:17 CDT

## 2022-09-28 ENCOUNTER — LAB (OUTPATIENT)
Dept: LAB | Facility: OTHER | Age: 22
End: 2022-09-28

## 2022-09-28 DIAGNOSIS — N89.8 VAGINAL ITCHING: ICD-10-CM

## 2022-09-28 DIAGNOSIS — O26.899 CRAMPING AFFECTING PREGNANCY, ANTEPARTUM: ICD-10-CM

## 2022-09-28 DIAGNOSIS — R10.9 CRAMPING AFFECTING PREGNANCY, ANTEPARTUM: ICD-10-CM

## 2022-09-28 LAB
BACTERIA UR QL AUTO: ABNORMAL /HPF
BILIRUB UR QL STRIP: NEGATIVE
CANDIDA ALBICANS: NEGATIVE
CLARITY UR: ABNORMAL
COLOR UR: YELLOW
GARDNERELLA VAGINALIS: POSITIVE
GLUCOSE UR STRIP-MCNC: NEGATIVE MG/DL
HGB UR QL STRIP.AUTO: NEGATIVE
HYALINE CASTS UR QL AUTO: ABNORMAL /LPF
KETONES UR QL STRIP: NEGATIVE
LEUKOCYTE ESTERASE UR QL STRIP.AUTO: ABNORMAL
NITRITE UR QL STRIP: NEGATIVE
PH UR STRIP.AUTO: 7 [PH] (ref 5.5–8)
PROT UR QL STRIP: NEGATIVE
RBC # UR STRIP: ABNORMAL /HPF
REF LAB TEST METHOD: ABNORMAL
SP GR UR STRIP: 1.02 (ref 1–1.03)
SQUAMOUS #/AREA URNS HPF: ABNORMAL /HPF
T VAGINALIS DNA VAG QL PROBE+SIG AMP: NEGATIVE
UROBILINOGEN UR QL STRIP: ABNORMAL
WBC # UR STRIP: ABNORMAL /HPF

## 2022-09-28 PROCEDURE — 87660 TRICHOMONAS VAGIN DIR PROBE: CPT | Performed by: NURSE PRACTITIONER

## 2022-09-28 PROCEDURE — 87086 URINE CULTURE/COLONY COUNT: CPT | Performed by: NURSE PRACTITIONER

## 2022-09-28 PROCEDURE — 87480 CANDIDA DNA DIR PROBE: CPT | Performed by: NURSE PRACTITIONER

## 2022-09-28 PROCEDURE — 81001 URINALYSIS AUTO W/SCOPE: CPT | Performed by: NURSE PRACTITIONER

## 2022-09-28 PROCEDURE — 87510 GARDNER VAG DNA DIR PROBE: CPT | Performed by: NURSE PRACTITIONER

## 2022-09-29 ENCOUNTER — TELEPHONE (OUTPATIENT)
Dept: OBSTETRICS AND GYNECOLOGY | Facility: CLINIC | Age: 22
End: 2022-09-29

## 2022-09-29 LAB — BACTERIA SPEC AEROBE CULT: NO GROWTH

## 2022-09-29 RX ORDER — METRONIDAZOLE 500 MG/1
500 TABLET ORAL 2 TIMES DAILY
Qty: 14 TABLET | Refills: 0 | Status: SHIPPED | OUTPATIENT
Start: 2022-09-29 | End: 2022-10-06

## 2022-09-29 NOTE — TELEPHONE ENCOUNTER
----- Message from MEGHAN Reyes sent at 9/29/2022  9:11 AM CDT -----  Seen by TERRI Harrison for her NOB. Pt has BV, please order Flagyl 500mg BID for 7 days. Waiting for urine culture to come back.

## 2022-10-13 ENCOUNTER — LAB (OUTPATIENT)
Dept: LAB | Facility: HOSPITAL | Age: 22
End: 2022-10-13

## 2022-10-13 ENCOUNTER — ROUTINE PRENATAL (OUTPATIENT)
Dept: OBSTETRICS AND GYNECOLOGY | Facility: CLINIC | Age: 22
End: 2022-10-13

## 2022-10-13 VITALS — SYSTOLIC BLOOD PRESSURE: 114 MMHG | DIASTOLIC BLOOD PRESSURE: 68 MMHG | BODY MASS INDEX: 34.61 KG/M2 | WEIGHT: 227.6 LBS

## 2022-10-13 DIAGNOSIS — O99.119 FACTOR V LEIDEN MUTATION COMPLICATING PREGNANCY: ICD-10-CM

## 2022-10-13 DIAGNOSIS — Z13.1 SCREENING FOR DIABETES MELLITUS: ICD-10-CM

## 2022-10-13 DIAGNOSIS — Z3A.15 15 WEEKS GESTATION OF PREGNANCY: ICD-10-CM

## 2022-10-13 DIAGNOSIS — Z82.79 FAMILY HISTORY OF BIRTH DEFECT: ICD-10-CM

## 2022-10-13 DIAGNOSIS — O09.92 HIGH-RISK PREGNANCY IN SECOND TRIMESTER: Primary | ICD-10-CM

## 2022-10-13 DIAGNOSIS — D68.51 FACTOR V LEIDEN MUTATION COMPLICATING PREGNANCY: ICD-10-CM

## 2022-10-13 LAB — GLUCOSE 1H P 100 G GLC PO SERPL-MCNC: 131 MG/DL (ref 65–139)

## 2022-10-13 PROCEDURE — 0502F SUBSEQUENT PRENATAL CARE: CPT | Performed by: OBSTETRICS & GYNECOLOGY

## 2022-10-13 PROCEDURE — 36415 COLL VENOUS BLD VENIPUNCTURE: CPT

## 2022-10-13 PROCEDURE — 82950 GLUCOSE TEST: CPT

## 2022-10-13 NOTE — PROGRESS NOTES
CC: Prenatal visit    Marsha Damon is a 22 y.o.  at 15w0d.  Doing well.  Denies contractions, LOF, or VB.     /68   Wt 103 kg (227 lb 9.6 oz)   LMP 2022 (Approximate)   BMI 34.61 kg/m²   Fetal Heart Rate: 160     Problems (from 22 to present)     Problem Noted Resolved    Family history of birth defect 2022 by Samantha Harrison APRN No    Overview Signed 2022  1:12 PM by Samantha Harrison APRN     Sister with club foot         High-risk pregnancy in second trimester 2022 by Samantha Harrison APRN No    Factor V Leiden mutation complicating pregnancy (HCC) 2022 by Samantha Harrison APRN No    Overview Signed 2022  1:12 PM by Samantha Harrison APRN     ASA 81mg daily until 35 weeks             A/P: Marsha Damon is a 22 y.o.  at 15w0d.  - RTC in 4 weeks w/ anatomy US  - Finish course of progesterone (has 1 week left)  - Reviewed COVID-19 visitation policy  - Reviewed COVID-19 precautions     Diagnosis Plan   1. High-risk pregnancy in second trimester  US Ob 14 + Weeks Single or First Gestation      2. Family history of birth defect        3. Factor V Leiden mutation complicating pregnancy (HCC)        4. 15 weeks gestation of pregnancy          Sara Adams MD  10/13/2022  09:55 CDT

## 2022-10-19 ENCOUNTER — REFERRAL TRIAGE (OUTPATIENT)
Dept: LABOR AND DELIVERY | Facility: HOSPITAL | Age: 22
End: 2022-10-19

## 2022-11-10 ENCOUNTER — ROUTINE PRENATAL (OUTPATIENT)
Dept: OBSTETRICS AND GYNECOLOGY | Facility: CLINIC | Age: 22
End: 2022-11-10

## 2022-11-10 VITALS — DIASTOLIC BLOOD PRESSURE: 68 MMHG | BODY MASS INDEX: 34.21 KG/M2 | WEIGHT: 225 LBS | SYSTOLIC BLOOD PRESSURE: 104 MMHG

## 2022-11-10 DIAGNOSIS — Z82.79 FAMILY HISTORY OF BIRTH DEFECT: ICD-10-CM

## 2022-11-10 DIAGNOSIS — O09.92 HIGH-RISK PREGNANCY IN SECOND TRIMESTER: ICD-10-CM

## 2022-11-10 DIAGNOSIS — O99.119 FACTOR V LEIDEN MUTATION COMPLICATING PREGNANCY: ICD-10-CM

## 2022-11-10 DIAGNOSIS — Z3A.19 19 WEEKS GESTATION OF PREGNANCY: Primary | ICD-10-CM

## 2022-11-10 DIAGNOSIS — D68.51 FACTOR V LEIDEN MUTATION COMPLICATING PREGNANCY: ICD-10-CM

## 2022-11-10 PROCEDURE — 0502F SUBSEQUENT PRENATAL CARE: CPT | Performed by: NURSE PRACTITIONER

## 2022-11-10 NOTE — PROGRESS NOTES
CC: Prenatal visit    Marsha Damon is a 22 y.o.  at 19w0d.  Doing well.  No complaints.  Denies contractions, LOF, or VB.  Reports good FM.    /68   Wt 102 kg (225 lb)   LMP 2022 (Approximate)   BMI 34.21 kg/m²   Reviewed preliminary US- fetus vertex, placenta posterior, no previa, HC 88.4%tile, AC 76.8%tile,  g/12 oz, all anatomy views obtained, CL 3.31 cm, bilateral ovaries wnl, BOY           Problems (from 22 to present)     Problem Noted Resolved    Family history of birth defect 2022 by Samantha Harrison APRN No    Overview Signed 2022  1:12 PM by Samantha Harrison APRN     Sister with club foot         High-risk pregnancy in second trimester 2022 by Samantha Harrison APRN No    Factor V Leiden mutation complicating pregnancy (HCC) 2022 by Samantha Harrison APRN No    Overview Signed 2022  1:12 PM by Samantha Harrison APRN     ASA 81mg daily until 35 weeks               A/P: Marsha Damon is a 22 y.o.  at 19w0d.  - RTC in 4 weeks     Diagnosis Plan   1. 19 weeks gestation of pregnancy        2. High-risk pregnancy in second trimester        3. Family history of birth defect        4. Factor V Leiden mutation complicating pregnancy (HCC)            MEGHAN Mcallister  11/10/2022  16:17 CST

## 2022-12-08 ENCOUNTER — PATIENT OUTREACH (OUTPATIENT)
Dept: OBSTETRICS AND GYNECOLOGY | Facility: HOSPITAL | Age: 22
End: 2022-12-08

## 2022-12-08 ENCOUNTER — ROUTINE PRENATAL (OUTPATIENT)
Dept: OBSTETRICS AND GYNECOLOGY | Facility: CLINIC | Age: 22
End: 2022-12-08

## 2022-12-08 VITALS — DIASTOLIC BLOOD PRESSURE: 72 MMHG | SYSTOLIC BLOOD PRESSURE: 114 MMHG | WEIGHT: 234 LBS | BODY MASS INDEX: 35.58 KG/M2

## 2022-12-08 DIAGNOSIS — D68.51 FACTOR V LEIDEN MUTATION COMPLICATING PREGNANCY: ICD-10-CM

## 2022-12-08 DIAGNOSIS — O99.119 FACTOR V LEIDEN MUTATION COMPLICATING PREGNANCY: ICD-10-CM

## 2022-12-08 DIAGNOSIS — Z3A.23 23 WEEKS GESTATION OF PREGNANCY: ICD-10-CM

## 2022-12-08 DIAGNOSIS — Z82.79 FAMILY HISTORY OF BIRTH DEFECT: ICD-10-CM

## 2022-12-08 DIAGNOSIS — O09.92 HIGH-RISK PREGNANCY IN SECOND TRIMESTER: Primary | ICD-10-CM

## 2022-12-08 PROCEDURE — 0502F SUBSEQUENT PRENATAL CARE: CPT | Performed by: OBSTETRICS & GYNECOLOGY

## 2022-12-08 NOTE — OUTREACH NOTE
[facility]  Enrollment    Current Estimated Gestational Age: 23w0d    Questions/Answers    Flowsheet Row Responses   Would like to participate? Yes   Date of Intake Visit 12/20/22              Krys Carter RN  Maternity Nurse Navigator    12/8/2022, 14:54 CST

## 2022-12-09 NOTE — PROGRESS NOTES
"CC: Prenatal visit    Marsha Damon is a 22 y.o.  at 23w0d.  Doing well.  Denies contractions, LOF, or VB.  Reports good FM; she states that it has been present but less the past couple of days.  She is wanting to do a home birth; she states that \"her 's family are Christians and they do home births.\"    /72   Wt 106 kg (234 lb)   LMP 2022 (Approximate)   BMI 35.58 kg/m²   Fundal Height (cm): 24 cm  Fetal Heart Rate: 133     Problems (from 22 to present)     Problem Noted Resolved    Family history of birth defect 2022 by Samantha Harrison APRN No    Overview Signed 2022  1:12 PM by Samantha Harrison APRN     Sister with club foot         High-risk pregnancy in second trimester 2022 by Samantha Harrison APRN No    Factor V Leiden mutation complicating pregnancy (HCC) 2022 by Samantha Harrison APRN No    Overview Signed 2022  1:12 PM by Samantha Harrison APRN     ASA 81mg daily until 35 weeks             A/P: Marsha Damon is a 22 y.o.  at 23w0d.  - RTC in 4 weeks w/ 3T labs, Tdap, breastpump script  - Reassured patient regarding inconsistent FM at this time in her pregnancy.  - Discussed that I do not recommend a home birth as there are complications during labor and delivery that can results in maternal and/or fetal death or serious complications (like cerebral palsy) that could be prevented or lessened if she delivered in the hospital. I discussed things like shoulder dystocia, cord prolapse, placental abruption, uterine rupture that would be life-threatening to mom and/or baby. I discussed that ultimately the decision is her's to make. I did discuss that we do not perform interventions, operative delivery, episiotomies, or  deliveries if not indicated. I also discussed that she does have pain alternatives and if she does not want an epidural, we do not require or manipulate her into having one. I discussed that if she had complications like " GDM or HTN or anything else that would arise during her pregnancy, I would continue to reiterate recommendation for hospital birth and encourage that more so.  She voices understanding.  - Reviewed COVID-19 visitation policy  - Reviewed COVID-19 precautions     Diagnosis Plan   1. High-risk pregnancy in second trimester  CBC (No Diff)    Glucose, Post 50 Gm Glucola      2. Family history of birth defect        3. Factor V Leiden mutation complicating pregnancy (HCC)        4. 23 weeks gestation of pregnancy          Sara Adams MD  12/8/2022  18:39 CST

## 2022-12-20 ENCOUNTER — PATIENT OUTREACH (OUTPATIENT)
Dept: OBSTETRICS AND GYNECOLOGY | Facility: HOSPITAL | Age: 22
End: 2022-12-20

## 2022-12-20 DIAGNOSIS — O09.92 HIGH-RISK PREGNANCY IN SECOND TRIMESTER: ICD-10-CM

## 2022-12-20 DIAGNOSIS — D68.51 FACTOR V LEIDEN MUTATION COMPLICATING PREGNANCY: ICD-10-CM

## 2022-12-20 DIAGNOSIS — O99.119 FACTOR V LEIDEN MUTATION COMPLICATING PREGNANCY: ICD-10-CM

## 2022-12-20 DIAGNOSIS — Z82.79 FAMILY HISTORY OF BIRTH DEFECT: Primary | ICD-10-CM

## 2022-12-20 NOTE — OUTREACH NOTE
[facility]  Check-In    Current Estimated Gestational Age: 24w5d    Questions/Answers    Flowsheet Row Responses   Best Method for Contacting Cell   Demographics Reviewed Yes   Currently Employed No   Able to keep appointments as scheduled Yes   Gender(s) and Name(s) Boy- Wayden   Baby Active/Feeling Fetal Movemen Yes   How are you presently feeling? feeling well, getting excited.   Questions regarding prenatal visits or tests to be ordered? No   Education related to new diagnoses/home equipment No   May I ask you questions about your substance use? Yes   Other Comment denies   Resource/Environmental Concerns None   Do you have any questions related to your care experience, your pregnancy, plans for delivery, any concerns, etc? No            Krys Carter RN  Maternity Nurse Navigator    12/20/2022, 10:31 CST      [facility]  Intake    Current Estimated Gestational Age: 24w5d    Questions/Answers    Flowsheet Row Responses   Best Method for Contacting Cell   Do you have a dentist? No   Resources Presently Utilizing: WIC (Women, Infant, Children)   Maternal Warning Signs Provided          Krys Carter RN  Maternity Nurse Navigator    12/20/2022, 10:31 CST

## 2023-01-05 ENCOUNTER — ROUTINE PRENATAL (OUTPATIENT)
Dept: OBSTETRICS AND GYNECOLOGY | Facility: CLINIC | Age: 23
End: 2023-01-05
Payer: COMMERCIAL

## 2023-01-05 VITALS — BODY MASS INDEX: 36.77 KG/M2 | DIASTOLIC BLOOD PRESSURE: 74 MMHG | SYSTOLIC BLOOD PRESSURE: 116 MMHG | WEIGHT: 241.8 LBS

## 2023-01-05 DIAGNOSIS — O09.92 HIGH-RISK PREGNANCY IN SECOND TRIMESTER: Primary | ICD-10-CM

## 2023-01-05 DIAGNOSIS — D68.51 FACTOR V LEIDEN MUTATION COMPLICATING PREGNANCY: ICD-10-CM

## 2023-01-05 DIAGNOSIS — Z3A.27 27 WEEKS GESTATION OF PREGNANCY: ICD-10-CM

## 2023-01-05 DIAGNOSIS — Z82.79 FAMILY HISTORY OF BIRTH DEFECT: ICD-10-CM

## 2023-01-05 DIAGNOSIS — O99.119 FACTOR V LEIDEN MUTATION COMPLICATING PREGNANCY: ICD-10-CM

## 2023-01-05 PROCEDURE — 0502F SUBSEQUENT PRENATAL CARE: CPT | Performed by: OBSTETRICS & GYNECOLOGY

## 2023-01-06 NOTE — PROGRESS NOTES
CC: Prenatal visit    Marsha Damon is a 23 y.o.  at 27w0d.  Doing well.  Denies LOF or VB.  Reports good FM.  Reports some ankle and hand swelling as well as occasional irregular B-H contractions.    /74   Wt 110 kg (241 lb 12.8 oz)   LMP 2022 (Approximate)   BMI 36.77 kg/m²   Fundal Height (cm): 28 cm  Fetal Heart Rate: 151     Problems (from 22 to present)     Problem Noted Resolved    Family history of birth defect 2022 by Samantha Harrison APRN No    Overview Signed 2022  1:12 PM by Samantha Harrison APRN     Sister with club foot         High-risk pregnancy in second trimester 2022 by Samantha Harrison APRN No    Factor V Leiden mutation complicating pregnancy (HCC) 2022 by Samantha Harrison APRN No    Overview Signed 2022  1:12 PM by Samantha Harrison APRN     ASA 81mg daily until 35 weeks             A/P: Marsha Damon is a 23 y.o.  at 27w0d.  - RTC in 3 weeks  - Tdap information given  - Breastpump script sent  - Was not signed up for lab so did not do 3T labs; will plan for next visit  - Reviewed COVID-19 visitation policy  - Reviewed COVID-19 precautions     Diagnosis Plan   1. High-risk pregnancy in second trimester        2. Family history of birth defect        3. Factor V Leiden mutation complicating pregnancy (HCC)        4. 27 weeks gestation of pregnancy          Sara Adams MD  2023  19:11 CST

## 2023-01-10 ENCOUNTER — PATIENT OUTREACH (OUTPATIENT)
Dept: LABOR AND DELIVERY | Facility: HOSPITAL | Age: 23
End: 2023-01-10
Payer: COMMERCIAL

## 2023-01-10 NOTE — OUTREACH NOTE
[facility]  Check-In    Current Estimated Gestational Age: 27w5d    Questions/Answers    Flowsheet Row Responses   Best Method for Contacting Cell   Demographics Reviewed Yes   Currently Employed No   Able to keep appointments as scheduled Yes   Gender(s) and Name(s) Boy- Wayden   Baby Active/Feeling Fetal Movemen Yes   How are you presently feeling? doing well, leaking colostrum now.   Questions regarding prenatal visits or tests to be ordered? No   Education related to new diagnoses/home equipment No   May I ask you questions about your substance use? Yes   Other Comment denies   Supplies ready for baby Clothing, Diapers, Feeding Supplies   Resource/Environmental Concerns None   Do you have any questions related to your care experience, your pregnancy, plans for delivery, any concerns, etc? Yes   Question Questions addressed about when it is safe to pump and collect colostrum.   Other Education Insurance benefits/Incentives, Meds to Beds, Mental Health Services          Krys Carter RN  Maternity Nurse Navigator    1/10/2023, 10:26 CST

## 2023-01-26 ENCOUNTER — ROUTINE PRENATAL (OUTPATIENT)
Dept: OBSTETRICS AND GYNECOLOGY | Facility: CLINIC | Age: 23
End: 2023-01-26
Payer: COMMERCIAL

## 2023-01-26 ENCOUNTER — LAB (OUTPATIENT)
Dept: LAB | Facility: HOSPITAL | Age: 23
End: 2023-01-26
Payer: COMMERCIAL

## 2023-01-26 VITALS — SYSTOLIC BLOOD PRESSURE: 120 MMHG | WEIGHT: 250 LBS | DIASTOLIC BLOOD PRESSURE: 68 MMHG | BODY MASS INDEX: 38.01 KG/M2

## 2023-01-26 DIAGNOSIS — D68.51 FACTOR V LEIDEN MUTATION COMPLICATING PREGNANCY: ICD-10-CM

## 2023-01-26 DIAGNOSIS — O09.93 HIGH-RISK PREGNANCY IN THIRD TRIMESTER: ICD-10-CM

## 2023-01-26 DIAGNOSIS — O09.92 HIGH-RISK PREGNANCY IN SECOND TRIMESTER: ICD-10-CM

## 2023-01-26 DIAGNOSIS — O99.119 FACTOR V LEIDEN MUTATION COMPLICATING PREGNANCY: ICD-10-CM

## 2023-01-26 DIAGNOSIS — Z3A.30 30 WEEKS GESTATION OF PREGNANCY: Primary | ICD-10-CM

## 2023-01-26 DIAGNOSIS — Z82.79 FAMILY HISTORY OF BIRTH DEFECT: ICD-10-CM

## 2023-01-26 LAB — GLUCOSE 1H P 100 G GLC PO SERPL-MCNC: 122 MG/DL (ref 65–139)

## 2023-01-26 PROCEDURE — 36415 COLL VENOUS BLD VENIPUNCTURE: CPT

## 2023-01-26 PROCEDURE — 82950 GLUCOSE TEST: CPT

## 2023-01-26 PROCEDURE — 0502F SUBSEQUENT PRENATAL CARE: CPT | Performed by: NURSE PRACTITIONER

## 2023-01-26 PROCEDURE — 85027 COMPLETE CBC AUTOMATED: CPT

## 2023-01-26 NOTE — PROGRESS NOTES
CC: Prenatal visit    Marsha Damon is a 23 y.o.  at 30w0d.  Doing well.  No complaints.  Denies contractions, LOF, or VB.  Reports good FM.    /68   Wt 113 kg (250 lb)   LMP 2022 (Approximate)   BMI 38.01 kg/m²              Problems (from 22 to present)     Problem Noted Resolved    Family history of birth defect 2022 by Samantha Harrison APRN No    Overview Signed 2022  1:12 PM by Samantha Harrison APRN     Sister with club foot         High-risk pregnancy in third trimester 2022 by Samantha Harrison APRN No    Factor V Leiden mutation complicating pregnancy (HCC) 2022 by Samantha Harrison APRN No    Overview Signed 2022  1:12 PM by Samantha Harrison APRN     ASA 81mg daily until 35 weeks               A/P: Marsha Damon is a 23 y.o.  at 30w0d.  - RTC in  2 weeks     Diagnosis Plan   1. 30 weeks gestation of pregnancy        2. High-risk pregnancy in third trimester        3. Family history of birth defect        4. Factor V Leiden mutation complicating pregnancy (HCC)            MEGHAN Mcallister  2023  15:31 CST

## 2023-01-27 LAB
DEPRECATED RDW RBC AUTO: 42.6 FL (ref 37–54)
ERYTHROCYTE [DISTWIDTH] IN BLOOD BY AUTOMATED COUNT: 13 % (ref 12.3–15.4)
HCT VFR BLD AUTO: 37.2 % (ref 34–46.6)
HGB BLD-MCNC: 12.7 G/DL (ref 12–15.9)
MCH RBC QN AUTO: 30.8 PG (ref 26.6–33)
MCHC RBC AUTO-ENTMCNC: 34.1 G/DL (ref 31.5–35.7)
MCV RBC AUTO: 90.3 FL (ref 79–97)
PLATELET # BLD AUTO: 230 10*3/MM3 (ref 140–450)
PMV BLD AUTO: 11.5 FL (ref 6–12)
RBC # BLD AUTO: 4.12 10*6/MM3 (ref 3.77–5.28)
WBC NRBC COR # BLD: 10.95 10*3/MM3 (ref 3.4–10.8)

## 2023-02-09 ENCOUNTER — ROUTINE PRENATAL (OUTPATIENT)
Dept: OBSTETRICS AND GYNECOLOGY | Facility: CLINIC | Age: 23
End: 2023-02-09
Payer: COMMERCIAL

## 2023-02-09 VITALS — SYSTOLIC BLOOD PRESSURE: 114 MMHG | DIASTOLIC BLOOD PRESSURE: 70 MMHG | WEIGHT: 249 LBS | BODY MASS INDEX: 37.86 KG/M2

## 2023-02-09 DIAGNOSIS — O99.119 FACTOR V LEIDEN MUTATION COMPLICATING PREGNANCY: ICD-10-CM

## 2023-02-09 DIAGNOSIS — D68.51 FACTOR V LEIDEN MUTATION COMPLICATING PREGNANCY: ICD-10-CM

## 2023-02-09 DIAGNOSIS — O09.93 HIGH-RISK PREGNANCY IN THIRD TRIMESTER: Primary | ICD-10-CM

## 2023-02-09 DIAGNOSIS — Z3A.32 32 WEEKS GESTATION OF PREGNANCY: ICD-10-CM

## 2023-02-09 DIAGNOSIS — Z82.79 FAMILY HISTORY OF BIRTH DEFECT: ICD-10-CM

## 2023-02-09 DIAGNOSIS — O26.849 UTERINE SIZE DATE DISCREPANCY PREGNANCY: ICD-10-CM

## 2023-02-09 PROCEDURE — 0502F SUBSEQUENT PRENATAL CARE: CPT | Performed by: OBSTETRICS & GYNECOLOGY

## 2023-02-09 NOTE — PROGRESS NOTES
CC: Prenatal visit    Marsha Damon is a 23 y.o.  at 32w0d.  Doing well.  Denies contractions, LOF, or VB.  Reports good FM.  Having more B-H contractions.      /70   Wt 113 kg (249 lb)   LMP 2022 (Approximate)   BMI 37.86 kg/m²   Fundal Height (cm): 36 cm  Fetal Heart Rate: 143     Problems (from 22 to present)     Problem Noted Resolved    Family history of birth defect 2022 by Samantha Harrison APRN No    Overview Signed 2022  1:12 PM by Samantha Harrison APRN     Sister with club foot         High-risk pregnancy in third trimester 2022 by Samantha Harrison APRN No    Factor V Leiden mutation complicating pregnancy (HCC) 2022 by Samantha Harrison APRN No    Overview Signed 2022  1:12 PM by Samantha Harrison APRN     ASA 81mg daily until 35 weeks             A/P: Marsha Damon is a 23 y.o.  at 32w0d.  - RTC in 2 weeks w/ GS (S>D)  - Discussed delivery planning.  She is undecided, but leaning towards delivery at the hospital.  - Reviewed COVID-19 visitation policy  - Reviewed COVID-19 precautions     Diagnosis Plan   1. High-risk pregnancy in third trimester        2. Family history of birth defect        3. Factor V Leiden mutation complicating pregnancy (HCC)        4. Uterine size date discrepancy pregnancy  US Ob Follow Up Transabdominal Approach      5. 32 weeks gestation of pregnancy          aSra Adams MD  2023  15:22 CST

## 2023-02-16 NOTE — TELEPHONE ENCOUNTER
Patient notified rx sent in   Patient presenting for pre-operative labs and found to have Hgb <7 and repeat in ED with Hgb 6.6 requiring blood transfusion. Per chart review, patient underwent extensive evaluation for anemia several months ago including EGD, colonoscopy and video capsule endoscopy. VCE revealing two single, mucosal red spots in the small intestine. Per GI bleed, if repeat bleed, patient to have device-assisted endoscopy. Patient also with known wound of the lower extremities that he reports have bled in the past.  - s/p 1 unit of pRBCs in the ED  - maintain large bore IV  - continue to trend CBCs; transfuse if Hgb <7  - holding anticoagulation at this time  - Gastroenterology consult placed given patient's history, appreciate recommendations  - begin IV pantoprazole 40 mg BID

## 2023-02-23 ENCOUNTER — ROUTINE PRENATAL (OUTPATIENT)
Dept: OBSTETRICS AND GYNECOLOGY | Facility: CLINIC | Age: 23
End: 2023-02-23
Payer: COMMERCIAL

## 2023-02-23 VITALS — WEIGHT: 253 LBS | DIASTOLIC BLOOD PRESSURE: 74 MMHG | BODY MASS INDEX: 38.47 KG/M2 | SYSTOLIC BLOOD PRESSURE: 116 MMHG

## 2023-02-23 DIAGNOSIS — O09.93 HIGH-RISK PREGNANCY IN THIRD TRIMESTER: ICD-10-CM

## 2023-02-23 DIAGNOSIS — O99.119 FACTOR V LEIDEN MUTATION COMPLICATING PREGNANCY: ICD-10-CM

## 2023-02-23 DIAGNOSIS — Z3A.34 34 WEEKS GESTATION OF PREGNANCY: Primary | ICD-10-CM

## 2023-02-23 DIAGNOSIS — D68.51 FACTOR V LEIDEN MUTATION COMPLICATING PREGNANCY: ICD-10-CM

## 2023-02-23 DIAGNOSIS — Z82.79 FAMILY HISTORY OF BIRTH DEFECT: ICD-10-CM

## 2023-02-23 PROCEDURE — 0502F SUBSEQUENT PRENATAL CARE: CPT | Performed by: NURSE PRACTITIONER

## 2023-02-23 NOTE — PROGRESS NOTES
CC: Prenatal visit    Marsha Damon is a 23 y.o.  at 34w0d.  Doing well.  No complaints.  She is considering hospital delivery now due to baby measuring large.  Denies contractions, LOF, or VB.  Reports good FM.    LMP 2022 (Approximate)         FGS preliminary- fetus breech, placenta posterior, LYLA 15.27 cm,  bpm, AC 95.7%tile, EFW 2816 g/ 6lb 3 oz         Problems (from 22 to present)     Problem Noted Resolved    Family history of birth defect 2022 by Samantha Harrison APRN No    Overview Signed 2022  1:12 PM by Samantha Harrison APRN     Sister with club foot         High-risk pregnancy in third trimester 2022 by Samantha Harrison APRN No    Factor V Leiden mutation complicating pregnancy (HCC) 2022 by Samantha Harrison APRN No    Overview Signed 2022  1:12 PM by Samantha Harrison APRN     ASA 81mg daily until 35 weeks               A/P: Marsha Damon is a 23 y.o.  at 34w0d.  RV GBS swab at next appointment  BSUS to assess fetal position  - RTC in 2 weeks     Diagnosis Plan   1. 34 weeks gestation of pregnancy        2. High-risk pregnancy in third trimester        3. Family history of birth defect        4. Factor V Leiden mutation complicating pregnancy (HCC)            MEGHAN Mcallister  2023  13:49 CST

## 2023-03-07 ENCOUNTER — ANESTHESIA EVENT (OUTPATIENT)
Dept: LABOR AND DELIVERY | Facility: HOSPITAL | Age: 23
End: 2023-03-07
Payer: COMMERCIAL

## 2023-03-07 ENCOUNTER — ANESTHESIA (OUTPATIENT)
Dept: LABOR AND DELIVERY | Facility: HOSPITAL | Age: 23
End: 2023-03-07
Payer: COMMERCIAL

## 2023-03-07 ENCOUNTER — HOSPITAL ENCOUNTER (INPATIENT)
Facility: HOSPITAL | Age: 23
LOS: 4 days | Discharge: HOME | End: 2023-03-11
Attending: STUDENT IN AN ORGANIZED HEALTH CARE EDUCATION/TRAINING PROGRAM | Admitting: STUDENT IN AN ORGANIZED HEALTH CARE EDUCATION/TRAINING PROGRAM
Payer: COMMERCIAL

## 2023-03-07 DIAGNOSIS — Z98.891 STATUS POST PRIMARY LOW TRANSVERSE CESAREAN SECTION: ICD-10-CM

## 2023-03-07 LAB
ABO GROUP BLD: NORMAL
AMPHET+METHAMPHET UR QL: NEGATIVE
AMPHETAMINES UR QL: NEGATIVE
BARBITURATES UR QL SCN: NEGATIVE
BENZODIAZ UR QL SCN: NEGATIVE
BLD GP AB SCN SERPL QL: NEGATIVE
BUPRENORPHINE SERPL-MCNC: NEGATIVE NG/ML
CANNABINOIDS SERPL QL: NEGATIVE
COCAINE UR QL: NEGATIVE
DEPRECATED RDW RBC AUTO: 40.5 FL (ref 37–54)
ERYTHROCYTE [DISTWIDTH] IN BLOOD BY AUTOMATED COUNT: 13 % (ref 12.3–15.4)
HCT VFR BLD AUTO: 38.6 % (ref 34–46.6)
HGB BLD-MCNC: 13.1 G/DL (ref 12–15.9)
HOLD SPECIMEN: NORMAL
Lab: NORMAL
MCH RBC QN AUTO: 29.6 PG (ref 26.6–33)
MCHC RBC AUTO-ENTMCNC: 33.9 G/DL (ref 31.5–35.7)
MCV RBC AUTO: 87.3 FL (ref 79–97)
METHADONE UR QL SCN: NEGATIVE
OPIATES UR QL: NEGATIVE
OXYCODONE UR QL SCN: NEGATIVE
PCP UR QL SCN: NEGATIVE
PLATELET # BLD AUTO: 186 10*3/MM3 (ref 140–450)
PMV BLD AUTO: 11.2 FL (ref 6–12)
PROPOXYPH UR QL: NEGATIVE
RBC # BLD AUTO: 4.42 10*6/MM3 (ref 3.77–5.28)
RH BLD: POSITIVE
T&S EXPIRATION DATE: NORMAL
TRICYCLICS UR QL SCN: NEGATIVE
WBC NRBC COR # BLD: 10.86 10*3/MM3 (ref 3.4–10.8)

## 2023-03-07 PROCEDURE — 51703 INSERT BLADDER CATH COMPLEX: CPT

## 2023-03-07 PROCEDURE — 86901 BLOOD TYPING SEROLOGIC RH(D): CPT | Performed by: STUDENT IN AN ORGANIZED HEALTH CARE EDUCATION/TRAINING PROGRAM

## 2023-03-07 PROCEDURE — 25010000002 MORPHINE PER 10 MG: Performed by: NURSE ANESTHETIST, CERTIFIED REGISTERED

## 2023-03-07 PROCEDURE — 85027 COMPLETE CBC AUTOMATED: CPT | Performed by: STUDENT IN AN ORGANIZED HEALTH CARE EDUCATION/TRAINING PROGRAM

## 2023-03-07 PROCEDURE — 25010000002 AZITHROMYCIN PER 500 MG: Performed by: STUDENT IN AN ORGANIZED HEALTH CARE EDUCATION/TRAINING PROGRAM

## 2023-03-07 PROCEDURE — 80306 DRUG TEST PRSMV INSTRMNT: CPT | Performed by: STUDENT IN AN ORGANIZED HEALTH CARE EDUCATION/TRAINING PROGRAM

## 2023-03-07 PROCEDURE — 88307 TISSUE EXAM BY PATHOLOGIST: CPT

## 2023-03-07 PROCEDURE — 86900 BLOOD TYPING SEROLOGIC ABO: CPT | Performed by: STUDENT IN AN ORGANIZED HEALTH CARE EDUCATION/TRAINING PROGRAM

## 2023-03-07 PROCEDURE — 25010000002 ROPIVACAINE PER 1 MG: Performed by: STUDENT IN AN ORGANIZED HEALTH CARE EDUCATION/TRAINING PROGRAM

## 2023-03-07 PROCEDURE — 59510 CESAREAN DELIVERY: CPT | Performed by: STUDENT IN AN ORGANIZED HEALTH CARE EDUCATION/TRAINING PROGRAM

## 2023-03-07 PROCEDURE — 25010000002 FENTANYL CITRATE (PF) 50 MCG/ML SOLUTION: Performed by: NURSE ANESTHETIST, CERTIFIED REGISTERED

## 2023-03-07 PROCEDURE — 25010000002 ONDANSETRON PER 1 MG: Performed by: NURSE ANESTHETIST, CERTIFIED REGISTERED

## 2023-03-07 PROCEDURE — 59514 CESAREAN DELIVERY ONLY: CPT

## 2023-03-07 PROCEDURE — 25010000002 CEFAZOLIN PER 500 MG: Performed by: STUDENT IN AN ORGANIZED HEALTH CARE EDUCATION/TRAINING PROGRAM

## 2023-03-07 PROCEDURE — 94799 UNLISTED PULMONARY SVC/PX: CPT

## 2023-03-07 PROCEDURE — 86850 RBC ANTIBODY SCREEN: CPT | Performed by: STUDENT IN AN ORGANIZED HEALTH CARE EDUCATION/TRAINING PROGRAM

## 2023-03-07 PROCEDURE — 94760 N-INVAS EAR/PLS OXIMETRY 1: CPT

## 2023-03-07 PROCEDURE — 25010000002 DIPHENHYDRAMINE PER 50 MG: Performed by: STUDENT IN AN ORGANIZED HEALTH CARE EDUCATION/TRAINING PROGRAM

## 2023-03-07 PROCEDURE — 25010000002 KETOROLAC TROMETHAMINE PER 15 MG: Performed by: STUDENT IN AN ORGANIZED HEALTH CARE EDUCATION/TRAINING PROGRAM

## 2023-03-07 DEVICE — SEAL HEMO SURG ARISTA/AH ABS/PWDR 3GM: Type: IMPLANTABLE DEVICE | Site: UTERUS | Status: FUNCTIONAL

## 2023-03-07 RX ORDER — ONDANSETRON 2 MG/ML
4 INJECTION INTRAMUSCULAR; INTRAVENOUS ONCE
Status: DISCONTINUED | OUTPATIENT
Start: 2023-03-07 | End: 2023-03-11 | Stop reason: HOSPADM

## 2023-03-07 RX ORDER — SODIUM CHLORIDE 0.9 % (FLUSH) 0.9 %
10 SYRINGE (ML) INJECTION AS NEEDED
Status: DISCONTINUED | OUTPATIENT
Start: 2023-03-07 | End: 2023-03-07

## 2023-03-07 RX ORDER — MISOPROSTOL 200 UG/1
600 TABLET ORAL ONCE
Status: DISCONTINUED | OUTPATIENT
Start: 2023-03-07 | End: 2023-03-11 | Stop reason: HOSPADM

## 2023-03-07 RX ORDER — ONDANSETRON 2 MG/ML
INJECTION INTRAMUSCULAR; INTRAVENOUS AS NEEDED
Status: DISCONTINUED | OUTPATIENT
Start: 2023-03-07 | End: 2023-03-07 | Stop reason: SURG

## 2023-03-07 RX ORDER — ACETAMINOPHEN 500 MG
1000 TABLET ORAL ONCE
Status: COMPLETED | OUTPATIENT
Start: 2023-03-07 | End: 2023-03-07

## 2023-03-07 RX ORDER — SIMETHICONE 80 MG
80 TABLET,CHEWABLE ORAL 4 TIMES DAILY
Status: DISCONTINUED | OUTPATIENT
Start: 2023-03-07 | End: 2023-03-11 | Stop reason: HOSPADM

## 2023-03-07 RX ORDER — CALCIUM CARBONATE 200(500)MG
1 TABLET,CHEWABLE ORAL EVERY 4 HOURS PRN
Status: DISCONTINUED | OUTPATIENT
Start: 2023-03-07 | End: 2023-03-11 | Stop reason: HOSPADM

## 2023-03-07 RX ORDER — BETAMETHASONE SODIUM PHOSPHATE AND BETAMETHASONE ACETATE 3; 3 MG/ML; MG/ML
12 INJECTION, SUSPENSION INTRA-ARTICULAR; INTRALESIONAL; INTRAMUSCULAR; SOFT TISSUE EVERY 24 HOURS
Status: DISCONTINUED | OUTPATIENT
Start: 2023-03-07 | End: 2023-03-07

## 2023-03-07 RX ORDER — OXYTOCIN/0.9 % SODIUM CHLORIDE 30/500 ML
PLASTIC BAG, INJECTION (ML) INTRAVENOUS CONTINUOUS PRN
Status: DISCONTINUED | OUTPATIENT
Start: 2023-03-07 | End: 2023-03-07 | Stop reason: SURG

## 2023-03-07 RX ORDER — OXYCODONE HYDROCHLORIDE 5 MG/1
5 TABLET ORAL EVERY 4 HOURS PRN
Status: DISCONTINUED | OUTPATIENT
Start: 2023-03-07 | End: 2023-03-11 | Stop reason: HOSPADM

## 2023-03-07 RX ORDER — KETOROLAC TROMETHAMINE 15 MG/ML
15 INJECTION, SOLUTION INTRAMUSCULAR; INTRAVENOUS EVERY 6 HOURS
Status: COMPLETED | OUTPATIENT
Start: 2023-03-07 | End: 2023-03-08

## 2023-03-07 RX ORDER — ROPIVACAINE HYDROCHLORIDE 5 MG/ML
30 INJECTION, SOLUTION EPIDURAL; INFILTRATION; PERINEURAL ONCE
Status: DISCONTINUED | OUTPATIENT
Start: 2023-03-07 | End: 2023-03-07

## 2023-03-07 RX ORDER — ROPIVACAINE HYDROCHLORIDE 5 MG/ML
INJECTION, SOLUTION EPIDURAL; INFILTRATION; PERINEURAL AS NEEDED
Status: DISCONTINUED | OUTPATIENT
Start: 2023-03-07 | End: 2023-03-11 | Stop reason: HOSPADM

## 2023-03-07 RX ORDER — IBUPROFEN 600 MG/1
600 TABLET ORAL EVERY 6 HOURS
Status: DISCONTINUED | OUTPATIENT
Start: 2023-03-08 | End: 2023-03-11 | Stop reason: HOSPADM

## 2023-03-07 RX ORDER — ONDANSETRON 4 MG/1
4 TABLET, FILM COATED ORAL EVERY 8 HOURS PRN
Status: DISCONTINUED | OUTPATIENT
Start: 2023-03-07 | End: 2023-03-11 | Stop reason: HOSPADM

## 2023-03-07 RX ORDER — SODIUM CHLORIDE 0.9 % (FLUSH) 0.9 %
10 SYRINGE (ML) INJECTION EVERY 12 HOURS SCHEDULED
Status: DISCONTINUED | OUTPATIENT
Start: 2023-03-07 | End: 2023-03-07

## 2023-03-07 RX ORDER — TRISODIUM CITRATE DIHYDRATE AND CITRIC ACID MONOHYDRATE 500; 334 MG/5ML; MG/5ML
30 SOLUTION ORAL ONCE
Status: COMPLETED | OUTPATIENT
Start: 2023-03-07 | End: 2023-03-07

## 2023-03-07 RX ORDER — NALOXONE HCL 0.4 MG/ML
0.04 VIAL (ML) INJECTION
Status: DISCONTINUED | OUTPATIENT
Start: 2023-03-07 | End: 2023-03-11 | Stop reason: HOSPADM

## 2023-03-07 RX ORDER — FENTANYL CITRATE 50 UG/ML
INJECTION, SOLUTION INTRAMUSCULAR; INTRAVENOUS AS NEEDED
Status: DISCONTINUED | OUTPATIENT
Start: 2023-03-07 | End: 2023-03-07 | Stop reason: SURG

## 2023-03-07 RX ORDER — HYDROCORTISONE 25 MG/G
CREAM TOPICAL 3 TIMES DAILY PRN
Status: DISCONTINUED | OUTPATIENT
Start: 2023-03-07 | End: 2023-03-11 | Stop reason: HOSPADM

## 2023-03-07 RX ORDER — POLYETHYLENE GLYCOL 3350 17 G/17G
17 POWDER, FOR SOLUTION ORAL DAILY
Status: DISCONTINUED | OUTPATIENT
Start: 2023-03-07 | End: 2023-03-11 | Stop reason: HOSPADM

## 2023-03-07 RX ORDER — PRENATAL VIT/IRON FUM/FOLIC AC 27MG-0.8MG
1 TABLET ORAL DAILY
Status: DISCONTINUED | OUTPATIENT
Start: 2023-03-07 | End: 2023-03-11 | Stop reason: HOSPADM

## 2023-03-07 RX ORDER — ONDANSETRON 2 MG/ML
4 INJECTION INTRAMUSCULAR; INTRAVENOUS EVERY 6 HOURS PRN
Status: DISCONTINUED | OUTPATIENT
Start: 2023-03-07 | End: 2023-03-11 | Stop reason: HOSPADM

## 2023-03-07 RX ORDER — BUPIVACAINE HYDROCHLORIDE 7.5 MG/ML
INJECTION, SOLUTION EPIDURAL; RETROBULBAR
Status: COMPLETED | OUTPATIENT
Start: 2023-03-07 | End: 2023-03-07

## 2023-03-07 RX ORDER — DIPHENHYDRAMINE HYDROCHLORIDE 50 MG/ML
12.5 INJECTION INTRAMUSCULAR; INTRAVENOUS EVERY 6 HOURS PRN
Status: DISCONTINUED | OUTPATIENT
Start: 2023-03-07 | End: 2023-03-11 | Stop reason: HOSPADM

## 2023-03-07 RX ORDER — METHYLERGONOVINE MALEATE 0.2 MG/ML
200 INJECTION INTRAVENOUS ONCE AS NEEDED
Status: DISCONTINUED | OUTPATIENT
Start: 2023-03-07 | End: 2023-03-11 | Stop reason: HOSPADM

## 2023-03-07 RX ORDER — OXYTOCIN/0.9 % SODIUM CHLORIDE 30/500 ML
PLASTIC BAG, INJECTION (ML) INTRAVENOUS
Status: COMPLETED
Start: 2023-03-07 | End: 2023-03-07

## 2023-03-07 RX ORDER — OXYCODONE HYDROCHLORIDE 10 MG/1
10 TABLET ORAL EVERY 4 HOURS PRN
Status: DISCONTINUED | OUTPATIENT
Start: 2023-03-07 | End: 2023-03-11 | Stop reason: HOSPADM

## 2023-03-07 RX ORDER — SODIUM CHLORIDE, SODIUM LACTATE, POTASSIUM CHLORIDE, CALCIUM CHLORIDE 600; 310; 30; 20 MG/100ML; MG/100ML; MG/100ML; MG/100ML
125 INJECTION, SOLUTION INTRAVENOUS CONTINUOUS
Status: DISCONTINUED | OUTPATIENT
Start: 2023-03-07 | End: 2023-03-07

## 2023-03-07 RX ORDER — ALUMINA, MAGNESIA, AND SIMETHICONE 2400; 2400; 240 MG/30ML; MG/30ML; MG/30ML
15 SUSPENSION ORAL EVERY 4 HOURS PRN
Status: DISCONTINUED | OUTPATIENT
Start: 2023-03-07 | End: 2023-03-11 | Stop reason: HOSPADM

## 2023-03-07 RX ORDER — ACETAMINOPHEN 500 MG
1000 TABLET ORAL EVERY 6 HOURS
Status: COMPLETED | OUTPATIENT
Start: 2023-03-07 | End: 2023-03-08

## 2023-03-07 RX ORDER — BUPIVACAINE HCL/0.9 % NACL/PF 0.1 %
2 PLASTIC BAG, INJECTION (ML) EPIDURAL ONCE
Status: COMPLETED | OUTPATIENT
Start: 2023-03-07 | End: 2023-03-07

## 2023-03-07 RX ORDER — CARBOPROST TROMETHAMINE 250 UG/ML
250 INJECTION, SOLUTION INTRAMUSCULAR ONCE
Status: DISCONTINUED | OUTPATIENT
Start: 2023-03-07 | End: 2023-03-11 | Stop reason: HOSPADM

## 2023-03-07 RX ORDER — DOCUSATE SODIUM 100 MG/1
100 CAPSULE, LIQUID FILLED ORAL 2 TIMES DAILY PRN
Status: DISCONTINUED | OUTPATIENT
Start: 2023-03-07 | End: 2023-03-11 | Stop reason: HOSPADM

## 2023-03-07 RX ORDER — MORPHINE SULFATE 1 MG/ML
INJECTION, SOLUTION EPIDURAL; INTRATHECAL; INTRAVENOUS AS NEEDED
Status: DISCONTINUED | OUTPATIENT
Start: 2023-03-07 | End: 2023-03-07 | Stop reason: SURG

## 2023-03-07 RX ORDER — ACETAMINOPHEN 325 MG/1
650 TABLET ORAL EVERY 6 HOURS
Status: DISCONTINUED | OUTPATIENT
Start: 2023-03-08 | End: 2023-03-11 | Stop reason: HOSPADM

## 2023-03-07 RX ADMIN — BUPIVACAINE HYDROCHLORIDE 1.6 ML: 7.5 INJECTION, SOLUTION EPIDURAL; RETROBULBAR at 04:52

## 2023-03-07 RX ADMIN — Medication 2 G: at 04:55

## 2023-03-07 RX ADMIN — SODIUM CHLORIDE, POTASSIUM CHLORIDE, SODIUM LACTATE AND CALCIUM CHLORIDE: 600; 310; 30; 20 INJECTION, SOLUTION INTRAVENOUS at 05:53

## 2023-03-07 RX ADMIN — KETOROLAC TROMETHAMINE 15 MG: 15 INJECTION, SOLUTION INTRAMUSCULAR; INTRAVENOUS at 11:35

## 2023-03-07 RX ADMIN — DIPHENHYDRAMINE HYDROCHLORIDE 12.5 MG: 50 INJECTION, SOLUTION INTRAMUSCULAR; INTRAVENOUS at 08:00

## 2023-03-07 RX ADMIN — OXYTOCIN-SODIUM CHLORIDE 0.9% IV SOLN 30 UNIT/500ML 650 ML/HR: 30-0.9/5 SOLUTION at 05:18

## 2023-03-07 RX ADMIN — ONDANSETRON 8 MG: 2 INJECTION INTRAMUSCULAR; INTRAVENOUS at 04:47

## 2023-03-07 RX ADMIN — POLYETHYLENE GLYCOL 3350 17 G: 17 POWDER, FOR SOLUTION ORAL at 11:35

## 2023-03-07 RX ADMIN — ACETAMINOPHEN 1000 MG: 500 TABLET ORAL at 04:14

## 2023-03-07 RX ADMIN — SIMETHICONE 80 MG: 80 TABLET, CHEWABLE ORAL at 21:29

## 2023-03-07 RX ADMIN — Medication 150 MCG: at 04:52

## 2023-03-07 RX ADMIN — SODIUM CITRATE AND CITRIC ACID MONOHYDRATE 30 ML: 500; 334 SOLUTION ORAL at 04:40

## 2023-03-07 RX ADMIN — SIMETHICONE 80 MG: 80 TABLET, CHEWABLE ORAL at 11:35

## 2023-03-07 RX ADMIN — FENTANYL CITRATE 15 MCG: 50 INJECTION, SOLUTION INTRAMUSCULAR; INTRAVENOUS at 04:52

## 2023-03-07 RX ADMIN — ACETAMINOPHEN 1000 MG: 500 TABLET ORAL at 11:35

## 2023-03-07 RX ADMIN — PRENATAL VIT W/ FE FUMARATE-FA TAB 27-0.8 MG 1 TABLET: 27-0.8 TAB at 11:35

## 2023-03-07 RX ADMIN — SODIUM CHLORIDE, POTASSIUM CHLORIDE, SODIUM LACTATE AND CALCIUM CHLORIDE 125 ML/HR: 600; 310; 30; 20 INJECTION, SOLUTION INTRAVENOUS at 04:15

## 2023-03-07 RX ADMIN — ACETAMINOPHEN 1000 MG: 500 TABLET ORAL at 18:14

## 2023-03-07 RX ADMIN — SIMETHICONE 80 MG: 80 TABLET, CHEWABLE ORAL at 18:14

## 2023-03-07 RX ADMIN — KETOROLAC TROMETHAMINE 15 MG: 15 INJECTION, SOLUTION INTRAMUSCULAR; INTRAVENOUS at 18:14

## 2023-03-07 NOTE — H&P
HISTORY & PHYSICAL - Obstetrics  [facility]    Name: Marsha Damon  MRN: [id number]  Location: [facility]  Date: 2023  CSN: [id number]      CHIEF COMPLAINT:  section    HISTORY OF PRESENT ILLNESS  Marsha Damon is a 23 y.o.  at 35w5d gestational age by 1st TUS suggesting Estimated Date of Delivery: 23.  The patient is scheduled for a  section.  Reports SROM at 0115, clear fluid, awoke her from sleep. Scant spotting since then. Denies any significant vaginal bleeding.  Feeling occasional BH and cramping for contraction.  Good FM.    Patient denies any chest pain, palpitations, headaches, lightheadedness, shortness of breath, cough, nausea, vomiting, diarrhea, constipation, fever, or chills.    ROS  Review of Systems   Constitutional: Negative.    HENT: Negative.    Respiratory: Negative.    Cardiovascular: Negative.    Gastrointestinal: Negative.    Genitourinary: Negative.    Skin: Negative.    Psychiatric/Behavioral: Negative.        PRENATAL LAB RESULTS  Prenatal labs reviewed.    External Prenatal Results     Pregnancy Outside Results - Transcribed From Office Records - See Scanned Records For Details     Test Value Date Time    ABO  O  09/15/22 1055    Rh  Positive  09/15/22 1055    Antibody Screen  Negative  09/15/22 1055    Varicella IgG       Rubella  4.81 index 09/15/22 1055    Hgb  12.7 g/dL 23 1609       13.5 g/dL 09/15/22 1055    Hct  37.2 % 23 1609       38.9 % 09/15/22 1055    Glucose Fasting GTT       Glucose Tolerance Test 1 hour       Glucose Tolerance Test 3 hour       Gonorrhea (discrete)  Negative  09/15/22 1055    Chlamydia (discrete)  Negative  09/15/22 1055    RPR  Non-Reactive  09/15/22 1055    VDRL       Syphilis Antibody       HBsAg  Non-Reactive  09/15/22 1055    Herpes Simplex Virus PCR       Herpes Simplex VIrus Culture       HIV  Non-Reactive  09/15/22 1055    Hep C RNA Quant PCR       Hep C Antibody  Non-Reactive  09/15/22  1055    AFP       Group B Strep       GBS Susceptibility to Clindamycin       GBS Susceptibility to Erythromycin       Fetal Fibronectin       Genetic Testing, Maternal Blood             Drug Screening     Test Value Date Time    Urine Drug Screen       Amphetamine Screen  Negative  09/15/22 1055    Barbiturate Screen  Negative  09/15/22 1055    Benzodiazepine Screen  Negative  09/15/22 1055    Methadone Screen  Negative  09/15/22 1055    Phencyclidine Screen  Negative  09/15/22 1055    Opiates Screen  Negative  09/15/22 1055    THC Screen  Negative  09/15/22 1055    Cocaine Screen       Propoxyphene Screen  Negative  09/15/22 1055    Buprenorphine Screen  Negative  09/15/22 1055    Methamphetamine Screen       Oxycodone Screen  Negative  09/15/22 1055    Tricyclic Antidepressants Screen  Negative  09/15/22 1055          Legend    ^: Historical                        PRENATAL RISK FACTORS   Problems (from 22 to present)     Problem Noted Resolved    Family history of birth defect 2022 by Samantha Harrison APRN No    Overview Signed 2022  1:12 PM by Samantha Harrison APRN     Sister with club foot         High-risk pregnancy in third trimester 2022 by Samantha Harrison APRN No    Factor V Leiden mutation complicating pregnancy (HCC) 2022 by Samantha Harrison APRN No    Overview Signed 2022  1:12 PM by Samantha Harrison APRN     ASA 81mg daily until 35 weeks               DATING SUMMARY  LMP (22) -- DARIO 3/29/23  1TUS (22 at 8w6d) -- DARIO 23    OB HISTORY  OB History    Para Term  AB Living   4       3     SAB IAB Ectopic Molar Multiple Live Births   3                # Outcome Date GA Lbr Larry/2nd Weight Sex Delivery Anes PTL Lv   4 Current            3 2022           2 2021 6w0d          1 2021 7w0d            GYN HISTORY  Denies h/o sexually transmitted infections/pelvic inflammatory disease  Denies h/o abnormal pap smears, last pap was none due  "to age, plan for PP pap  Denies h/o gynecologic surgeries, including biopsies of the cervix    PAST MEDICAL HISTORY  Past Medical History:   Diagnosis Date   • Anxiety    • Factor V Leiden (HCC)    • Heterozygous MTHFR mutation [id number] 04/2022   • Ovarian cyst    • Recurrent pregnancy loss, antepartum condition or complication      PAST SURGICAL HISTORY  No past surgical history on file.     FAMILY HISTORY  Family History   Problem Relation Age of Onset   • Hypertension Mother    • Factor V Leiden deficiency Mother    • Thyroid disease Father    • No Known Problems Brother    • Cancer Maternal Grandmother    • Factor V Leiden deficiency Maternal Grandfather    • Colon cancer Maternal Grandfather    • No Known Problems Paternal Grandmother    • Skin cancer Paternal Grandfather      SOCIAL HISTORY  Social History     Socioeconomic History   • Marital status:    Tobacco Use   • Smoking status: Never   • Smokeless tobacco: Never   Vaping Use   • Vaping Use: Former   Substance and Sexual Activity   • Alcohol use: Not Currently   • Drug use: Never   • Sexual activity: Yes     Partners: Male     ALLERGIES  No Known Allergies  HOME MEDICATIONS  Prior to Admission medications    Medication Sig Start Date End Date Taking? Authorizing Provider   aspirin (aspirin) 81 MG EC tablet Take 1 tablet by mouth Daily. 5/9/22  Yes Sara Adams MD   prenatal vitamin (prenatal, CLASSIC, vitamin) tablet Take  by mouth Daily.   Yes Provider, MD Omar     PHYSICAL EXAM  /89 (BP Location: Right arm, Patient Position: Lying)   Pulse 94   Temp 98.1 °F (36.7 °C) (Oral)   Resp 18   Ht 172.7 cm (68\")   Wt 115 kg (253 lb)   LMP 06/22/2022 (Approximate)   SpO2 95%   BMI 38.47 kg/m²   General: No acute distress.  Well developed, well nourished.  Pleasant.  Heart: Regular rate and rhythm.   Lungs: No increased work of breathing  Abdomen: Soft, nontender to palpation, enlarged by gravid uterus.  Extremities: " Mild edema noted bilaterally.    NST Review  135, accels present, decels absent, mod variability, rare variable decel, ctx q4-5 mins on toco, patient not feeling all ctx    IMPRESSION  Marsha Damon is a 23 y.o.  at 35w5d presenting with SROM around 0115, breech fetal presentation.    PLAN  1.  IUP at 35w5d with breech presentation, SROM  - Discussed with patient gross ruptured membranes  - BSUS with breech fetal presentation  - Recommendation for PCS; discussed not candidate for ECV due to SROM due to risk of cord prolapse, limb prolapse, decreased fluid to allow for rotation  - Admit: Labor and Delivery  - Attending: Dr. Bustos  - Condition: Stable  - Vitals: per protocol  - Activity: ad gillian  - Nursing: NST prior to surgery  - Diet: NPO  - IV fluids:  mL/hr  - Allergies: NKDA  - Labs: CBC, T&S, UDS  - GBS: unknown.  Antibiotics not indicated.  - Ancef 2g and Azithromycin 500 mg prior to skin incision  - Patient consented for  section.  Reviewed risks and benefits to include injury to surrounding organs (bowel, bladder, ureters, blood vessels, nerves, baby), infection, bleeding (possibly requiring blood transfusion and/or hysterectomy), and maternal/fetal death.    - Marsha Damon and I have discussed pain goals for this hospitalization after reviewing her current clinical condition, medical history and prior pain experiences.  The goal is to keep her pain level appropriate.  To help achieve this, schedule Tylenol and NSAIDS, +/- Duramorph or oxycodone pain scale        This document has been electronically signed by Marzena Bustos DO on 2023 04:03 CST

## 2023-03-07 NOTE — L&D DELIVERY NOTE
[facility]  Operative Report    Name: Marsha Damon  MRN: [id number]  Date: 3/7/2023  CSN: [id number]      Location: [facility] LABOR DELIVERY    Service: Obstetrics    Pre-op Diagnosis: pregnancy at 35 weeks 5 days gestation, persistent breech presentation,  premature rupture of membranes    Post-op Diagnosis: same, status post primary  section    Surgeon: Marzena Bustos DO    Assistant: Kaye Schwartz, CST was responsible for performing the following activities: Retraction, Suction, Irrigation, Closing, Placing Dressing and Delivery of Fetus and their skilled assistance was necessary for the success of this case.    Staff:  Circulator: Ella Crowell RN  Scrub Person: Nany Castro  L & DAVIDA Nurse: Coral Priest RN  NICU Nurse: Renita Ruggiero RN    Anesthesia: Spinal    Anesthesia Staff:  CRNA: Gama Benitez CRNA    Operation: Primary low-transverse  section, KIERSTEN block    Drains: Bryant    Complications: None    Findings: Normal-appearing uterus, bilateral fallopian tubes, bilateral ovaries, Apgars 5+9, birth weight 2920 g    Condition: Stable    Specimens/Disposition: Placenta, cord blood, cord gases    Estimated Blood Loss: 350 mL  Quantitative Blood Loss: Pending  IV Fluids: 1700 mL  Urine Output: 250 mL    Indications: Marsha Damon, 23 y.o., G 1 P 0 with chief complaint of  spontaneous rupture of membranes, fetal breech presentation, recommendation for primary  section to which patient was amenable.  Did not receive betamethasone as patient declined due to discussion of limited benefit going back to the OR within the hour.    Description of Operation:  The patient was identified and the procedure verified as a  section delivery.  The patient was given spinal with Duramorph anesthesia.  Patient prepared and draped in normal sterile fashion in dorsal supine position with a leftward tilt.  A transverse skin incision was made with the  scalpel and carried down through the subcutaneous tissue to the fascia using the Bovie.  Fascial incision was made with the Bovie and extended transversely with Ellis scissors.  The superior aspect of the fascia was grasped with Kocher clamps and the rectus muscle was dissected off bluntly and sharply with Ellis scissors.  The inferior aspect of the fascia was then grasped with Kocher clamps and the rectus muscle and pyramidalis were dissected off bluntly and then sharply with Ellis scissors.  The rectus muscle was then  in the midline and the peritoneum was identified and entered bluntly.  The peritoneal incision was extended transversely.  Bladder blade and retractor were inserted.  The uterovesical peritoneal reflection was identified and incised transversely with Metzenbaum scissors.  The bladder flap was bluntly freed from the lower uterine segment. The bladder blade was adjusted.  A low transverse uterine incision was made with a scalpel and the uterine incision was extended with upward traction.  The amniotic sac was ruptured upon entry.  Delivered from breech presentation in the usual manner was a live male  weighing 2920 grams with Apgar scores of 5 at one minute and 9 at five minutes.  Cord clamped and cut and infant with bulb suction were handed to awaiting staff.  Cord blood was obtained and sent.  Cord gas was obtained and sent.    The placenta was removed intact and appeared normal.  Uterus exteriorized and cleared of all clots and debris.  The uterus, tubes and ovaries appeared normal.  The uterine incision was closed with running locked sutures of 0-Monocryl, a second layer of the same was used to imbricate the incision.     Posterior cul-de-sac was cleared.  Uterus was reinserted atraumatically.  Hemostasis was observed.  Bilateral paracolic gutters cleared.  Inspection of the abdomen and pelvis prior to abdominal wall closure revealed no evidence of retained instruments or sponges.   Hysterotomy was again examined and 2 small bleeders along the hysterotomy were oversewn with a figure-of-eight with excellent hemostasis.  Mary was placed over the hysterotomy for prophylactic hemostasis.  A KIERSTEN block was then placed with 30 cc of Ropivacaine at the superior fascial edge divided between the left and right. The fascia was then reapproximated with running sutures of 0-Vicryl.  Subcutaneous layer closed with 2-0 plain gut.  The skin was reapproximated with 3-0 Monocryl in subcuticular fashion.  Exofin dressing applied.    Estimated blood loss was 350 mL.  Sponge, needle and instrument counts were correct x2.  There were no intraoperative complications and patient tolerated the procedure well.  The patient was escorted to her room in stable condition.    The patient received cefazolin 2 g, azithromycin 500 mg for antibiotic prophylaxis prior to the start of the procedure.        This document has been electronically signed by Marzena Bustos DO on March 9, 2023 06:25 CST

## 2023-03-07 NOTE — PLAN OF CARE
Problem: Adult Inpatient Plan of Care  Goal: Plan of Care Review  Outcome: Ongoing, Progressing  Flowsheets (Taken 3/7/2023 9538)  Progress: improving  Plan of Care Reviewed With:   patient   spouse  Outcome Evaluation:   Pt came in with SROM   still breech and delivered  this morning. In recovery interacting and responding to    Goal Outcome Evaluation:  Plan of Care Reviewed With: patient, spouse        Progress: improving  Outcome Evaluation: Pt came in with SROM; still breech and delivered  this morning. In recovery interacting and responding to

## 2023-03-07 NOTE — ANESTHESIA PROCEDURE NOTES
Spinal Block      Patient reassessed immediately prior to procedure    Patient location during procedure: OR  Indication:at surgeon's request  Performed By  CRNA/CAA: Gama Benitez CRNA  Preanesthetic Checklist  Completed: patient identified, IV checked, site marked, risks and benefits discussed, surgical consent, monitors and equipment checked, pre-op evaluation and timeout performed  Spinal Block Prep:  Patient Position:sitting  Sterile Tech:cap, gloves, gown, mask and sterile barriers  Prep:DuraPrep  Patient Monitoring:blood pressure monitoring, continuous pulse oximetry and EKG    Spinal Block Procedure  Approach:midline  Guidance:landmark technique and palpation technique  Location:L3-L4  Needle Type:Pencan  Needle Gauge:24 G  Placement of Spinal needle event:cerebrospinal fluid aspirated  Paresthesia: no  Fluid Appearance:clear  Medications: bupivacaine PF (MARCAINE) 0.75 % injection - Intrathecal   1.6 mL - 3/7/2023 4:52:00 AM   Post Assessment  Patient Tolerance:patient tolerated the procedure well with no apparent complications  Complications no

## 2023-03-07 NOTE — L&D DELIVERY NOTE
[facility]   Primary Low Transverse  Delivery Note    Patient Name: Marsha Damon  : 2000  MRN: [id number]    Date of Delivery: 3/7/2023     Diagnosis     Pre & Post-Delivery:  Intrauterine pregnancy at 35w5d  Labor status:  Without Labor      premature rupture of membranes, unspecified as to length of time between rupture and onset of labor, third trimester    Family history of birth defect    High-risk pregnancy in third trimester    Factor V Leiden mutation complicating pregnancy (HCC)    Encounter for maternal care for breech presentation in marquez pregnancy    Status post primary low transverse  section             Problem List    Transfer to Postpartum     Review the Delivery Report for details.     Delivery     Delivery: , Low Transverse     YOB: 2021    Time of Birth:  Gestational Age 5:17 AM   35w5d     Anesthesia: Spinal     Delivering clinician: Marzena Bustos    Forceps?   No   Vacuum? No    Shoulder dystocia present: No        Delivery narrative:  See operative rpeort      Infant     Findings: male  infant     Infant observations: Weight: 2920 g (6 lb 7 oz)   Length: 19  in  Observations/Comments:        Apgars: 5  @ 1 minute /    9  @ 5 minutes   Infant Name: Wayden     Placenta & Cord         Placenta delivered  Manual removal  at   3/7/2023  5:19 AM     Cord: 3 vessels  present.   Nuchal Cord?  no   Cord blood obtained: Yes    Cord gases obtained:  Yes    Cord gas results: Venous:  No results found for: PHCVEN    Arterial:  No results found for: PHCART     Repair      Episiotomy: None     No    Lacerations: No   Estimated Blood Loss:       Quantitative Blood Loss: Quantitative Blood Loss (mL): 710 mL (23 0705)        Complications     none    Disposition     Mother to Mother Baby/Postpartum  in stable condition currently.  Baby to remains with mom  in stable condition currently.    Marzena Bustos DO  23  06:26  CST

## 2023-03-07 NOTE — ANESTHESIA PREPROCEDURE EVALUATION
Anesthesia Evaluation     NPO Solid Status: > 8 hours  NPO Liquid Status: > 8 hours           Airway   Mallampati: II  TM distance: >3 FB  Neck ROM: full  no difficulty expected  Dental - normal exam     Pulmonary - normal exam   Cardiovascular - normal exam        Neuro/Psych  GI/Hepatic/Renal/Endo    (+) obesity,       Musculoskeletal     Abdominal    Substance History      OB/GYN    (+) Pregnant,         Other                        Anesthesia Plan    ASA 2     spinal       Anesthetic plan, risks, benefits, and alternatives have been provided, discussed and informed consent has been obtained with: patient and spouse/significant other.        CODE STATUS:

## 2023-03-07 NOTE — ANESTHESIA POSTPROCEDURE EVALUATION
Patient: Marsha Damon    Procedure Summary     Date: 23 Room / Location: [facility] LABOR DELIVERY  [facility] LABOR DELIVERY    Anesthesia Start: 447 Anesthesia Stop: 607    Procedure:  SECTION PRIMARY (Abdomen) Diagnosis:       Maternal care for breech presentation, single gestation      (Maternal care for breech presentation, single gestation [O32.1XX0])    Surgeons: Marzena Bustos DO Provider: Gama Benitez CRNA    Anesthesia Type: spinal ASA Status: 2          Anesthesia Type: spinal    Vitals  No vitals data found for the desired time range.          Post Anesthesia Care and Evaluation    Patient location during evaluation: bedside  Patient participation: complete - patient participated  Level of consciousness: awake and alert  Pain management: adequate    Airway patency: patent  Anesthetic complications: No anesthetic complications    Cardiovascular status: acceptable  Respiratory status: acceptable  Hydration status: acceptable    Comments: --------------------            23               0324     --------------------   BP:       139/85     Pulse:      92       Resp:                Temp:                SpO2:               --------------------

## 2023-03-08 ENCOUNTER — PATIENT OUTREACH (OUTPATIENT)
Dept: LABOR AND DELIVERY | Facility: HOSPITAL | Age: 23
End: 2023-03-08
Payer: COMMERCIAL

## 2023-03-08 LAB
HCT VFR BLD AUTO: 33.3 % (ref 34–46.6)
HGB BLD-MCNC: 11.2 G/DL (ref 12–15.9)

## 2023-03-08 PROCEDURE — 85018 HEMOGLOBIN: CPT | Performed by: STUDENT IN AN ORGANIZED HEALTH CARE EDUCATION/TRAINING PROGRAM

## 2023-03-08 PROCEDURE — 85014 HEMATOCRIT: CPT | Performed by: STUDENT IN AN ORGANIZED HEALTH CARE EDUCATION/TRAINING PROGRAM

## 2023-03-08 PROCEDURE — 0503F POSTPARTUM CARE VISIT: CPT | Performed by: STUDENT IN AN ORGANIZED HEALTH CARE EDUCATION/TRAINING PROGRAM

## 2023-03-08 PROCEDURE — 25010000002 KETOROLAC TROMETHAMINE PER 15 MG: Performed by: STUDENT IN AN ORGANIZED HEALTH CARE EDUCATION/TRAINING PROGRAM

## 2023-03-08 RX ADMIN — PRENATAL VIT W/ FE FUMARATE-FA TAB 27-0.8 MG 1 TABLET: 27-0.8 TAB at 10:09

## 2023-03-08 RX ADMIN — ACETAMINOPHEN 650 MG: 325 TABLET ORAL at 21:43

## 2023-03-08 RX ADMIN — IBUPROFEN 600 MG: 600 TABLET, FILM COATED ORAL at 11:40

## 2023-03-08 RX ADMIN — ACETAMINOPHEN 1000 MG: 500 TABLET ORAL at 00:05

## 2023-03-08 RX ADMIN — ACETAMINOPHEN 1000 MG: 500 TABLET ORAL at 05:03

## 2023-03-08 RX ADMIN — SIMETHICONE 80 MG: 80 TABLET, CHEWABLE ORAL at 17:44

## 2023-03-08 RX ADMIN — KETOROLAC TROMETHAMINE 15 MG: 15 INJECTION, SOLUTION INTRAMUSCULAR; INTRAVENOUS at 00:05

## 2023-03-08 RX ADMIN — SIMETHICONE 80 MG: 80 TABLET, CHEWABLE ORAL at 10:09

## 2023-03-08 RX ADMIN — SIMETHICONE 80 MG: 80 TABLET, CHEWABLE ORAL at 11:40

## 2023-03-08 RX ADMIN — SIMETHICONE 80 MG: 80 TABLET, CHEWABLE ORAL at 21:43

## 2023-03-08 RX ADMIN — KETOROLAC TROMETHAMINE 15 MG: 15 INJECTION, SOLUTION INTRAMUSCULAR; INTRAVENOUS at 05:04

## 2023-03-08 RX ADMIN — IBUPROFEN 600 MG: 600 TABLET, FILM COATED ORAL at 17:44

## 2023-03-08 RX ADMIN — POLYETHYLENE GLYCOL 3350 17 G: 17 POWDER, FOR SOLUTION ORAL at 10:09

## 2023-03-08 RX ADMIN — ACETAMINOPHEN 650 MG: 325 TABLET ORAL at 15:06

## 2023-03-08 RX ADMIN — OXYCODONE HYDROCHLORIDE 5 MG: 5 TABLET ORAL at 11:40

## 2023-03-08 NOTE — PROGRESS NOTES
"[facility]  Progress Note - Obstetrics    Name: Marsha Damon  MRN: [id number]  Location: [facility]758  Date: 3/8/2023  CSN: [id number]    POD #1 s/p LTCS at 35w5d secondary to breech presentation and PPROM.     Patient seen and examined.  No complaints.  Pain well controlled.  Tolerating diet.  No nausea or vomiting.  No headache, dizziness, chest pain, or shortness of breath. No bowel movement.  Up and out of bed and ambulating.  Voiding without difficulty. Lochia minimal.    PHYSICAL EXAM  /76 (BP Location: Right arm, Patient Position: Sitting)   Pulse 98   Temp 97.7 °F (36.5 °C) (Oral)   Resp 18   Ht 172.7 cm (68\")   Wt 115 kg (253 lb)   LMP 2022 (Approximate)   SpO2 97%   Breastfeeding Unknown   BMI 38.47 kg/m²   General: No apparent distress.  Alert and cooperative.  Pleasant.  Heart: Regular rate and rhythm.  No murmurs, rubs, or gallops.  Lungs: Clear to auscultation bilaterally.  No wheezes, rales, or rhonchi.  Abdomen: Soft. No rebound tenderness or guarding.  +BS.  Dressing/Incision: Clean, dry, and intact.  No erythema or warmth.    Extremities: +2/4 posterior tibial.  No pitting edema , chronic bilateral edema in lower extremities.  No calf tenderness.  Uterus: Uterine fundus firm, non-tender and below umbilicus.      Intake/Output Summary (Last 24 hours) at 3/8/2023 0650  Last data filed at 3/7/2023 2100  Gross per 24 hour   Intake --   Output 1310 ml   Net -1310 ml     LABS  Hgb: 13.1 -> 11.2 (3/8/23)    IMPRESSION  Marsha Damon is a 23 y.o.  POD #1 s/p LTCS at 35w5d secondary to breech presentation and PPROM. Doing well and recovering appropriately.    PLAN  1.  Following surgery  - Continue routine post op care  - Encourage OOB and ambulation  - Encourage incentive spirometry  - Diet: Pregnancy/breastfeeding  - DVT prophylaxis: SCDs  - Contraception: Undecided   - Bottlefeeding  - Discharge patient home 2-4 days PPD.  1 week, 6 week follow-up for " postpartum.      This document has been electronically signed by Radha Schneider MD on March 8, 2023 07:43 CST     I personally saw and examined the patient with Dr. Schneider and was present during the key portion of the E/M service. I agree with the history and exam as documented by the resident. Otherwise, I agree with the assessment and plan as outlined above.        This document has been electronically signed by Marzena Bustos DO on March 21, 2023 21:58 CDT

## 2023-03-08 NOTE — PLAN OF CARE
Goal Outcome Evaluation:  Plan of Care Reviewed With: patient, spouse           Outcome Evaluation: VSS, FF, voids, ambulates in room, pain controlled with scheduled meds, IV SL

## 2023-03-08 NOTE — PLAN OF CARE
Problem: Adult Inpatient Plan of Care  Goal: Plan of Care Review  Outcome: Ongoing, Progressing  Flowsheets (Taken 3/8/2023 1510)  Progress: improving  Plan of Care Reviewed With:   patient   spouse  Outcome Evaluation: VSS, pain controlled with PO pain medication, fundus firm with light vaginal bleeding, voids and ambulates in room and hallway,passing flatus   Goal Outcome Evaluation:  Plan of Care Reviewed With: patient, spouse        Progress: improving  Outcome Evaluation: VSS, pain controlled with PO pain medication, fundus firm with light vaginal bleeding, voids and ambulates in room and hallway,passing flatus

## 2023-03-09 LAB — REF LAB TEST METHOD: NORMAL

## 2023-03-09 PROCEDURE — 0503F POSTPARTUM CARE VISIT: CPT | Performed by: STUDENT IN AN ORGANIZED HEALTH CARE EDUCATION/TRAINING PROGRAM

## 2023-03-09 RX ADMIN — IBUPROFEN 600 MG: 600 TABLET, FILM COATED ORAL at 10:13

## 2023-03-09 RX ADMIN — POLYETHYLENE GLYCOL 3350 17 G: 17 POWDER, FOR SOLUTION ORAL at 10:14

## 2023-03-09 RX ADMIN — IBUPROFEN 600 MG: 600 TABLET, FILM COATED ORAL at 17:46

## 2023-03-09 RX ADMIN — IBUPROFEN 600 MG: 600 TABLET, FILM COATED ORAL at 01:16

## 2023-03-09 RX ADMIN — SIMETHICONE 80 MG: 80 TABLET, CHEWABLE ORAL at 21:36

## 2023-03-09 RX ADMIN — ACETAMINOPHEN 650 MG: 325 TABLET ORAL at 21:36

## 2023-03-09 RX ADMIN — SIMETHICONE 80 MG: 80 TABLET, CHEWABLE ORAL at 17:46

## 2023-03-09 RX ADMIN — SIMETHICONE 80 MG: 80 TABLET, CHEWABLE ORAL at 10:13

## 2023-03-09 RX ADMIN — SIMETHICONE 80 MG: 80 TABLET, CHEWABLE ORAL at 12:32

## 2023-03-09 RX ADMIN — ACETAMINOPHEN 650 MG: 325 TABLET ORAL at 05:23

## 2023-03-09 RX ADMIN — PRENATAL VIT W/ FE FUMARATE-FA TAB 27-0.8 MG 1 TABLET: 27-0.8 TAB at 10:13

## 2023-03-09 NOTE — PROGRESS NOTES
"[facility]  Progress Note - Obstetrics    Name: Marsha Damon  MRN: [id number]  Location: M758  Date: 3/9/2023  CSN: [id number]    POD #2 s/p PLTCS at 35w5d secondary to breech presentation and PPROM.     Patient seen and examined.  No complaints.  Pain well controlled.  Tolerating diet.  No nausea or vomiting.  No headache, dizziness, chest pain, or shortness of breath.  Passed flatus. Up and out of bed and ambulating.  Voiding without difficulty.  Lochia minimal.  Bottlefeeding.    PHYSICAL EXAM  /76 (BP Location: Left arm, Patient Position: Sitting)   Pulse 97   Temp 97.8 °F (36.6 °C) (Oral)   Resp 18   Ht 172.7 cm (68\")   Wt 115 kg (253 lb)   LMP 2022 (Approximate)   SpO2 96%   Breastfeeding Unknown   BMI 38.47 kg/m²   General: No apparent distress.  Alert and cooperative.  Pleasant.  Heart: Regular rate and rhythm.  No murmurs, rubs, or gallops.  Lungs: Clear to auscultation bilaterally.  No wheezes, rales, or rhonchi.  Abdomen: Soft.  nontender to palpation.  No rebound tenderness or guarding.  +BS.  Dressing/Incision: Clean, dry, and intact.  No erythema or warmth.   Extremities: +2/4 posterior tibial.  Trace nonpitting edema in lower extremities, improved from yesterday.  No calf tenderness.  Uterus: Uterine fundus firm, non-tender and below umbilicus.    No intake or output data in the 24 hours ending 23 0728  LABS  Hgb: 13.1 -> 11.2 (3/8/23)    IMPRESSION  Marsha Damon is a 23 y.o.  POD #1 s/p PLTCS at 35w5d secondary to breech presentation and PPROM. Improvement in lower extremity edema with elevation of legs. Overall doing well and recovering appropriately.    PLAN  1.  Following surgery  - Continue routine post op care  - Encourage OOB and ambulation  - Encourage incentive spirometry  - Diet: Pregnancy/breastfeeding  - DVT prophylaxis: SCDs  - Contraception: undecided   - Bottlefeeding  - Discharge patient home 2-3 days PPD.  1 week, 6 " week follow-up for postpartum.      This document has been electronically signed by Radha Schneider MD on March 9, 2023 07:32 CST     I personally saw and examined the patient with Dr. Schneider and was present during the key portion of the E/M service. I agree with the history and exam as documented by the resident. Otherwise, I agree with the assessment and plan as outlined above.        This document has been electronically signed by Marzena Bustos DO on March 9, 2023 20:56 CST

## 2023-03-10 LAB
ALBUMIN SERPL-MCNC: 3 G/DL (ref 3.5–5.2)
ALBUMIN/GLOB SERPL: 1 G/DL
ALP SERPL-CCNC: 88 U/L (ref 39–117)
ALT SERPL W P-5'-P-CCNC: 25 U/L (ref 1–33)
ANION GAP SERPL CALCULATED.3IONS-SCNC: 11 MMOL/L (ref 5–15)
AST SERPL-CCNC: 30 U/L (ref 1–32)
BILIRUB SERPL-MCNC: 0.2 MG/DL (ref 0–1.2)
BUN SERPL-MCNC: 9 MG/DL (ref 6–20)
BUN/CREAT SERPL: 15.3 (ref 7–25)
CALCIUM SPEC-SCNC: 8.9 MG/DL (ref 8.6–10.5)
CHLORIDE SERPL-SCNC: 104 MMOL/L (ref 98–107)
CO2 SERPL-SCNC: 22 MMOL/L (ref 22–29)
CREAT SERPL-MCNC: 0.59 MG/DL (ref 0.57–1)
DEPRECATED RDW RBC AUTO: 43 FL (ref 37–54)
EGFRCR SERPLBLD CKD-EPI 2021: 131.7 ML/MIN/1.73
ERYTHROCYTE [DISTWIDTH] IN BLOOD BY AUTOMATED COUNT: 13 % (ref 12.3–15.4)
GLOBULIN UR ELPH-MCNC: 2.9 GM/DL
GLUCOSE SERPL-MCNC: 86 MG/DL (ref 65–99)
HCT VFR BLD AUTO: 36 % (ref 34–46.6)
HGB BLD-MCNC: 11.8 G/DL (ref 12–15.9)
HOLD SPECIMEN: NORMAL
MCH RBC QN AUTO: 29.6 PG (ref 26.6–33)
MCHC RBC AUTO-ENTMCNC: 32.8 G/DL (ref 31.5–35.7)
MCV RBC AUTO: 90.2 FL (ref 79–97)
PLATELET # BLD AUTO: 187 10*3/MM3 (ref 140–450)
PMV BLD AUTO: 10.2 FL (ref 6–12)
POTASSIUM SERPL-SCNC: 4.1 MMOL/L (ref 3.5–5.2)
PROT SERPL-MCNC: 5.9 G/DL (ref 6–8.5)
RBC # BLD AUTO: 3.99 10*6/MM3 (ref 3.77–5.28)
SODIUM SERPL-SCNC: 137 MMOL/L (ref 136–145)
WBC NRBC COR # BLD: 8.34 10*3/MM3 (ref 3.4–10.8)

## 2023-03-10 PROCEDURE — 80053 COMPREHEN METABOLIC PANEL: CPT | Performed by: STUDENT IN AN ORGANIZED HEALTH CARE EDUCATION/TRAINING PROGRAM

## 2023-03-10 PROCEDURE — 85027 COMPLETE CBC AUTOMATED: CPT | Performed by: STUDENT IN AN ORGANIZED HEALTH CARE EDUCATION/TRAINING PROGRAM

## 2023-03-10 PROCEDURE — 0503F POSTPARTUM CARE VISIT: CPT | Performed by: STUDENT IN AN ORGANIZED HEALTH CARE EDUCATION/TRAINING PROGRAM

## 2023-03-10 RX ORDER — OXYTOCIN/0.9 % SODIUM CHLORIDE 30/500 ML
250 PLASTIC BAG, INJECTION (ML) INTRAVENOUS CONTINUOUS
Status: DISCONTINUED | OUTPATIENT
Start: 2023-03-10 | End: 2023-03-10

## 2023-03-10 RX ORDER — KETOROLAC TROMETHAMINE 30 MG/ML
30 INJECTION, SOLUTION INTRAMUSCULAR; INTRAVENOUS ONCE
Status: DISCONTINUED | OUTPATIENT
Start: 2023-03-10 | End: 2023-03-10

## 2023-03-10 RX ORDER — ONDANSETRON 2 MG/ML
4 INJECTION INTRAMUSCULAR; INTRAVENOUS EVERY 6 HOURS PRN
Status: DISCONTINUED | OUTPATIENT
Start: 2023-03-10 | End: 2023-03-10

## 2023-03-10 RX ORDER — OXYTOCIN/0.9 % SODIUM CHLORIDE 30/500 ML
999 PLASTIC BAG, INJECTION (ML) INTRAVENOUS ONCE
Status: DISCONTINUED | OUTPATIENT
Start: 2023-03-10 | End: 2023-03-10

## 2023-03-10 RX ORDER — NIFEDIPINE 30 MG/1
30 TABLET, EXTENDED RELEASE ORAL
Status: DISCONTINUED | OUTPATIENT
Start: 2023-03-10 | End: 2023-03-11 | Stop reason: HOSPADM

## 2023-03-10 RX ORDER — ONDANSETRON 4 MG/1
4 TABLET, FILM COATED ORAL EVERY 6 HOURS PRN
Status: DISCONTINUED | OUTPATIENT
Start: 2023-03-10 | End: 2023-03-10

## 2023-03-10 RX ADMIN — IBUPROFEN 600 MG: 600 TABLET, FILM COATED ORAL at 11:59

## 2023-03-10 RX ADMIN — POLYETHYLENE GLYCOL 3350 17 G: 17 POWDER, FOR SOLUTION ORAL at 09:01

## 2023-03-10 RX ADMIN — SIMETHICONE 80 MG: 80 TABLET, CHEWABLE ORAL at 23:38

## 2023-03-10 RX ADMIN — IBUPROFEN 600 MG: 600 TABLET, FILM COATED ORAL at 00:14

## 2023-03-10 RX ADMIN — IBUPROFEN 600 MG: 600 TABLET, FILM COATED ORAL at 18:20

## 2023-03-10 RX ADMIN — ACETAMINOPHEN 650 MG: 325 TABLET ORAL at 15:40

## 2023-03-10 RX ADMIN — ACETAMINOPHEN 650 MG: 325 TABLET ORAL at 09:01

## 2023-03-10 RX ADMIN — SIMETHICONE 80 MG: 80 TABLET, CHEWABLE ORAL at 18:20

## 2023-03-10 RX ADMIN — SIMETHICONE 80 MG: 80 TABLET, CHEWABLE ORAL at 11:59

## 2023-03-10 RX ADMIN — PRENATAL VIT W/ FE FUMARATE-FA TAB 27-0.8 MG 1 TABLET: 27-0.8 TAB at 09:01

## 2023-03-10 RX ADMIN — SIMETHICONE 80 MG: 80 TABLET, CHEWABLE ORAL at 09:01

## 2023-03-10 RX ADMIN — ACETAMINOPHEN 650 MG: 325 TABLET ORAL at 23:38

## 2023-03-10 RX ADMIN — IBUPROFEN 600 MG: 600 TABLET, FILM COATED ORAL at 05:54

## 2023-03-10 RX ADMIN — NIFEDIPINE 30 MG: 30 TABLET, FILM COATED, EXTENDED RELEASE ORAL at 09:01

## 2023-03-10 NOTE — PLAN OF CARE
Problem: Adult Inpatient Plan of Care  Goal: Plan of Care Review  Outcome: Ongoing, Progressing  Flowsheets (Taken 3/8/2023 1510 by Darleen Gerber RN)  Progress: improving  Plan of Care Reviewed With:   patient   spouse  Outcome Evaluation: VSS, pain controlled with PO pain medication, fundus firm with light vaginal bleeding, voids and ambulates in room and hallway,passing flatus  Goal: Patient-Specific Goal (Individualized)  Outcome: Ongoing, Progressing  Goal: Absence of Hospital-Acquired Illness or Injury  Outcome: Ongoing, Progressing  Intervention: Identify and Manage Fall Risk  Recent Flowsheet Documentation  Taken 3/9/2023 1518 by Amelie Hoover RN  Safety Promotion/Fall Prevention: safety round/check completed  Taken 3/9/2023 1418 by Amelie Hoover RN  Safety Promotion/Fall Prevention:   safety round/check completed   assistive device/personal items within reach   clutter free environment maintained  Taken 3/9/2023 1318 by Amelie Hoover RN  Safety Promotion/Fall Prevention:   safety round/check completed   assistive device/personal items within reach   clutter free environment maintained  Taken 3/9/2023 1218 by Amelie Hoover RN  Safety Promotion/Fall Prevention:   safety round/check completed   assistive device/personal items within reach   clutter free environment maintained  Taken 3/9/2023 1118 by Amelie Hoover RN  Safety Promotion/Fall Prevention:   safety round/check completed   assistive device/personal items within reach   clutter free environment maintained  Taken 3/9/2023 1018 by Amelie Hoover RN  Safety Promotion/Fall Prevention:   safety round/check completed   assistive device/personal items within reach   clutter free environment maintained  Taken 3/9/2023 0918 by Amelie Hoover RN  Safety Promotion/Fall Prevention:   safety round/check completed   assistive device/personal items within reach   clutter free environment maintained  Taken  3/9/2023 0818 by Amelie Hoover RN  Safety Promotion/Fall Prevention:   safety round/check completed   assistive device/personal items within reach   clutter free environment maintained  Taken 3/9/2023 0718 by Amelie Hoover RN  Safety Promotion/Fall Prevention:   safety round/check completed   assistive device/personal items within reach   clutter free environment maintained  Intervention: Prevent Skin Injury  Recent Flowsheet Documentation  Taken 3/9/2023 1018 by Amelie Hoover RN  Body Position: sitting up in bed  Intervention: Prevent and Manage VTE (Venous Thromboembolism) Risk  Recent Flowsheet Documentation  Taken 3/9/2023 1018 by Amelie Hoover RN  Activity Management: up ad gillian  VTE Prevention/Management:   bilateral   patient refused intervention  Goal: Optimal Comfort and Wellbeing  Outcome: Ongoing, Progressing  Intervention: Monitor Pain and Promote Comfort  Recent Flowsheet Documentation  Taken 3/9/2023 1018 by Amelie Hoover RN  Pain Management Interventions: (scheduled meds given) see MAR  Intervention: Provide Person-Centered Care  Recent Flowsheet Documentation  Taken 3/9/2023 1018 by Amelie Hoover RN  Trust Relationship/Rapport:   care explained   questions encouraged   questions answered  Goal: Readiness for Transition of Care  Outcome: Ongoing, Progressing     Problem: Adjustment to Role Transition (Postpartum  Delivery)  Goal: Successful Maternal Role Transition  Outcome: Ongoing, Progressing  Intervention: Support Maternal Role Transition  Recent Flowsheet Documentation  Taken 3/9/2023 1018 by Amelie Hoover RN  Supportive Measures: active listening utilized     Problem: Bleeding (Postpartum  Delivery)  Goal: Hemostasis  Outcome: Ongoing, Progressing     Problem: Infection (Postpartum  Delivery)  Goal: Absence of Infection Signs and Symptoms  Outcome: Ongoing, Progressing     Problem: Pain (Postpartum   Delivery)  Goal: Acceptable Pain Control  Outcome: Ongoing, Progressing  Intervention: Prevent or Manage Pain  Recent Flowsheet Documentation  Taken 3/9/2023 1018 by Amelie Hoover RN  Pain Management Interventions: (scheduled meds given) see MAR     Problem: Postoperative Nausea and Vomiting (Postpartum  Delivery)  Goal: Nausea and Vomiting Relief  Outcome: Ongoing, Progressing     Problem: Postoperative Urinary Retention (Postpartum  Delivery)  Goal: Effective Urinary Elimination  Outcome: Ongoing, Progressing     Problem: Breastfeeding  Goal: Effective Breastfeeding  Outcome: Ongoing, Progressing  Intervention: Support Exclusive Breastfeeding Success  Recent Flowsheet Documentation  Taken 3/9/2023 1018 by Amelie Hoover RN  Supportive Measures: active listening utilized     Problem:  Fall Injury Risk  Goal: Absence of Fall, Infant Drop and Related Injury  Outcome: Ongoing, Progressing  Intervention: Promote Injury-Free Environment  Recent Flowsheet Documentation  Taken 3/9/2023 1518 by Amelie Hoover RN  Safety Promotion/Fall Prevention: safety round/check completed  Taken 3/9/2023 1418 by Amelie Hoover RN  Safety Promotion/Fall Prevention:   safety round/check completed   assistive device/personal items within reach   clutter free environment maintained  Taken 3/9/2023 1318 by Amelie Hoover RN  Safety Promotion/Fall Prevention:   safety round/check completed   assistive device/personal items within reach   clutter free environment maintained  Taken 3/9/2023 1218 by Amelie Hoover RN  Safety Promotion/Fall Prevention:   safety round/check completed   assistive device/personal items within reach   clutter free environment maintained  Taken 3/9/2023 1118 by Amelie Hoover RN  Safety Promotion/Fall Prevention:   safety round/check completed   assistive device/personal items within reach   clutter free environment maintained  Taken 3/9/2023  1018 by Amelie Hoover RN  Safety Promotion/Fall Prevention:   safety round/check completed   assistive device/personal items within reach   clutter free environment maintained  Taken 3/9/2023 0918 by Amelie Hoover RN  Safety Promotion/Fall Prevention:   safety round/check completed   assistive device/personal items within reach   clutter free environment maintained  Taken 3/9/2023 0818 by Amelie Hoover RN  Safety Promotion/Fall Prevention:   safety round/check completed   assistive device/personal items within reach   clutter free environment maintained  Taken 3/9/2023 0718 by Amelie Hoover RN  Safety Promotion/Fall Prevention:   safety round/check completed   assistive device/personal items within reach   clutter free environment maintained   Goal Outcome Evaluation:

## 2023-03-10 NOTE — PLAN OF CARE
Problem: Adult Inpatient Plan of Care  Goal: Plan of Care Review  Outcome: Ongoing, Progressing  Flowsheets (Taken 3/10/2023 5463)  Progress: no change  Plan of Care Reviewed With: patient  Outcome Evaluation: denies pain, ff min bleedinf, ambulates in room and hallway   Goal Outcome Evaluation:  Plan of Care Reviewed With: patient        Progress: no change  Outcome Evaluation: denies pain, ff min bleedinf, ambulates in room and hallway

## 2023-03-10 NOTE — PROGRESS NOTES
"[facility]  Progress Note - Obstetrics    Name: Marsha Damon  MRN: [id number]  Location: M758  Date: 3/10/2023  CSN: [id number]    POD #3 s/p PLTCS at 35w5d secondary to breech presentation and PPROM.     Patient seen and examined.  No complaints.  Pain well controlled.  Tolerating diet.  No nausea or vomiting.  No headache, dizziness, chest pain, or shortness of breath.  Passed flatus, Had a bowel movement.  Up and out of bed and ambulating.  Voiding without difficulty.  Lochia minimal.  Bottlefeeding.    PHYSICAL EXAM  /85 (BP Location: Left arm, Patient Position: Sitting)   Pulse 89   Temp 97.6 °F (36.4 °C) (Oral)   Resp 18   Ht 172.7 cm (68\")   Wt 115 kg (253 lb)   LMP 2022 (Approximate)   SpO2 98%   Breastfeeding Unknown   BMI 38.47 kg/m²   General: No apparent distress.  Alert and cooperative.  Pleasant.  Heart: Regular rate and rhythm.  No murmurs, rubs, or gallops.  Lungs: Clear to auscultation bilaterally.  No wheezes, rales, or rhonchi.  Abdomen: Soft.  nontender to palpation.  No rebound tenderness or guarding.  +BS.  Dressing/Incision: Clean, dry, and intact.  No erythema or warmth.    Extremities: +2/4 posterior tibial.  Nonpitting edema in lower extremities.  No calf tenderness.  Uterus: Uterine fundus firm, non-tender and below umbilicus.    No intake or output data in the 24 hours ending 03/10/23 0659  LABS  Hgb: 13.1 -> 11.2 (3/8/23)> 11.8    IMPRESSION  Marsha Damon is a 23 y.o.  POD #3 s/p PLTCS at 35w5d secondary to breech presentation and PPROM. Nonpitting lower extremity edema still present, encouraged elevation of legs when she sleeps. Overall doing well and recovering appropriately. New diagnosis of GHTN.    PLAN  1.  Following surgery  - Continue routine post op care  - Encourage OOB and ambulation  - Encourage incentive spirometry  - Diet: Pregnancy/breastfeeding  - DVT prophylaxis: SCDs  - Contraception: undecided   - " Bottlefeeding  - Discharge patient home POD#4.  1 week, 6 week follow-up for postpartum.    2. GHTN  - New mild range BPs  - Labs WNL  - No s/s of PreE, will continue to monitor until POD#4  - Started on Procardia 30 XL daily        This document has been electronically signed by Radha Schneider MD on March 10, 2023 07:46 CST     I personally saw and examined the patient with Dr. Schneider and was present during the key portion of the E/M service. I agree with the history and exam as documented by the resident.  Otherwise, I agree with the assessment and plan as outlined above.        This document has been electronically signed by Marzena Bustos DO on March 10, 2023 09:33 CST

## 2023-03-10 NOTE — PLAN OF CARE
Problem: Adult Inpatient Plan of Care  Goal: Plan of Care Review  Outcome: Ongoing, Progressing  Flowsheets (Taken 3/10/2023 1721)  Progress: improving  Plan of Care Reviewed With: patient  Outcome Evaluation: Ambulating in room, voids, going to NICU often.   Goal Outcome Evaluation:  Plan of Care Reviewed With: patient        Progress: improving  Outcome Evaluation: Ambulating in room, voids, going to NICU often.

## 2023-03-11 VITALS
TEMPERATURE: 98.3 F | HEIGHT: 68 IN | SYSTOLIC BLOOD PRESSURE: 143 MMHG | BODY MASS INDEX: 38.34 KG/M2 | WEIGHT: 253 LBS | RESPIRATION RATE: 18 BRPM | OXYGEN SATURATION: 96 % | HEART RATE: 93 BPM | DIASTOLIC BLOOD PRESSURE: 84 MMHG

## 2023-03-11 PROCEDURE — 0503F POSTPARTUM CARE VISIT: CPT | Performed by: STUDENT IN AN ORGANIZED HEALTH CARE EDUCATION/TRAINING PROGRAM

## 2023-03-11 RX ORDER — IBUPROFEN 800 MG/1
800 TABLET ORAL EVERY 8 HOURS PRN
Qty: 30 TABLET | Refills: 1 | Status: SHIPPED | OUTPATIENT
Start: 2023-03-11 | End: 2023-03-21

## 2023-03-11 RX ORDER — OXYCODONE HYDROCHLORIDE AND ACETAMINOPHEN 5; 325 MG/1; MG/1
1 TABLET ORAL EVERY 4 HOURS PRN
Qty: 12 TABLET | Refills: 0 | Status: SHIPPED | OUTPATIENT
Start: 2023-03-11 | End: 2023-03-21

## 2023-03-11 RX ADMIN — PRENATAL VIT W/ FE FUMARATE-FA TAB 27-0.8 MG 1 TABLET: 27-0.8 TAB at 08:31

## 2023-03-11 RX ADMIN — SIMETHICONE 80 MG: 80 TABLET, CHEWABLE ORAL at 08:30

## 2023-03-11 RX ADMIN — ACETAMINOPHEN 650 MG: 325 TABLET ORAL at 08:30

## 2023-03-11 RX ADMIN — IBUPROFEN 600 MG: 600 TABLET, FILM COATED ORAL at 05:23

## 2023-03-11 RX ADMIN — NIFEDIPINE 30 MG: 30 TABLET, FILM COATED, EXTENDED RELEASE ORAL at 08:31

## 2023-03-11 RX ADMIN — IBUPROFEN 600 MG: 600 TABLET, FILM COATED ORAL at 11:47

## 2023-03-11 NOTE — DISCHARGE INSTR - APPOINTMENTS
An after hours [facility] for [facility] to call you to schedule a 1 week postpartum follow up and a 6 week postpartum follow up. If you do not hear from them in a reasonable amount of time, please call their office at [phone].

## 2023-03-11 NOTE — DISCHARGE SUMMARY
[facility]  Delivery Discharge Summary    Primary OB Clinician:     EDC: Estimated Date of Delivery: 23    Gestational Age:35w5d    Antepartum complications: none    Date of Delivery: 3/7/2023   Time of Delivery: 5:17 AM     Delivered By:  Marzena Bustos     Delivery Type: , Low Transverse      Tubal Ligation: n/a    Baby:male  infant;   Apgar:  5  @ 1 minute /   Apgar:  9  @ 5 minutes   Weight: 2920 g (6 lb 7 oz)    Length: 19     Anesthesia: Spinal      Intrapartum complications: PROM; Breech presentation    Laceration: No    Episiotomy: No    Placenta: Manual removal     Feeding method: Breastfeeding Status: Unknown    Rh Immune globulin given: not applicable    Rubella vaccine given: not applicable    Discharge Date: 3/11/2023; Discharge Time: 11:50 CST    Early Discharge:  NO    Plan:    POD#4 - 22yo F, , s/p a PCD at 35 5/7 weeks gestation secondary to premature ROM and breech presentation.  Rh+/Claudia/GBSunk.  Meeting postpartum milestones and stable for discharge.    Plan:  - Pain: Ibuprofen 800mg Q8hrs prn; and Percocet 5/325mg Q4hrs prn  - Diet: Regular  - Activity: No driving.  No lifting anything heavier than your baby.  Shower as often as you like, but avoid tub baths or swimming until after your postpartum checkup. There should be nothing placed in the vagina until after your postpartum checkup. This means no tampons, douching or intercourse (sex)  - Disposition: Discharge to home with Follow-up in 2 weeks for incision check and 6 weeks for routine postpartum care + contraceptive management        Address and phone number verified and same.  Follow-up appointment in 2 weeks.

## 2023-03-11 NOTE — DISCHARGE INSTRUCTIONS
No baths for 4 weeks. Only take showers.  No driving for one week or while taking any narcotic pain medicine.   No lifting anything heavier than baby in the car seat for 6 weeks.  Nothing in the vagina for 6 weeks including tampons and sex.

## 2023-03-15 ENCOUNTER — PATIENT OUTREACH (OUTPATIENT)
Dept: LABOR AND DELIVERY | Facility: HOSPITAL | Age: 23
End: 2023-03-15
Payer: COMMERCIAL

## 2023-03-15 NOTE — OUTREACH NOTE
Motherhood Connection  Postpartum Check-In    Questions/Answers    Flowsheet Row Responses   Visit Setting In Person   Best Method for Contacting Cell   OB Discharge Note Reviewed  Reviewed   OB Discharge Navigator Reviewed  Reviewed   OB Discharge Medications Reviewed  Reviewed   [place] discharged home with mother? Infant in ICU   Comment baby is getting to go home following circumcision care.   Current Pain Levels 0-10 0   At Rest Pain Levels 0-10 0   Pain level with activity 0-10 2   Acceptable Pain Level 0-10 6   Verbalized Emotional State Acceptance, Relief   Family/Support Network Family, Spouse   Level of Involvement in Care Attentive, Interactive, Supportive   Do you feel comfortable in your relationship with your baby? Yes   Have members of your household adjusted to your baby? N/A  [going home from NICU today]   Is the baby's father supportive and/or involved with the baby? Yes   How does your partner feel about the baby? Happy, Involved   Do you feel safe at home, school and work? Yes   Are you in a relationship with someone who threatens you or hurts you? No   Do you have the resources to keep yourself and your baby healthy and safe? Yes   Lochia (per patient report) Brown-omid Red   Amount Spotting   Number of pads per day 4   Lochia Odor None   Is patient breastfeeding? Yes, pumping   pumping - Left Breast Soft, Nontender   Pumping - Right Breast Nontender, Soft   Frequency q 3 hours   Pumping Quantity 3-5 oz   Storage of Milk fridge and freezer   Postpartum Depression Screening Education Education Provided   Peripheral Blood Glucose Education Provided   Doctor Appointments: Education Provided   Breastfeeding Education Education Provided   Family Planning Education Education Provided   Postpartum Care Education Education Provided   S & S to report Education Provided   Followup Appointments Made N/A   Well Child Visit Appointments Made N/A   Did you complete the visit? Yes   Were there any specific  concerns? No          Review of Systems-negative    Bly  Depression Score: 2 (3/9/2023  4:01 AM)          Krys Berger RN  Maternity Nurse Navigator    3/15/2023, 12:27 CDT

## 2023-03-21 ENCOUNTER — POSTPARTUM VISIT (OUTPATIENT)
Dept: OBSTETRICS AND GYNECOLOGY | Facility: CLINIC | Age: 23
End: 2023-03-21
Payer: COMMERCIAL

## 2023-03-21 VITALS
DIASTOLIC BLOOD PRESSURE: 86 MMHG | SYSTOLIC BLOOD PRESSURE: 134 MMHG | WEIGHT: 237 LBS | HEIGHT: 67 IN | BODY MASS INDEX: 37.2 KG/M2

## 2023-03-21 PROCEDURE — 0503F POSTPARTUM CARE VISIT: CPT | Performed by: OBSTETRICS & GYNECOLOGY

## 2023-03-21 NOTE — PROGRESS NOTES
"[facility]  Obstetrics  Date of Service: 2023    CC: Postpartum visit      Marsha Damon is a 23 y.o.  s/p , Low Transverse on 3/7/2023 at 35w5d secondary to PPROM, breech who presents today for postpartum check.  The patient states she is doing well.  Patient denies postpartum depression.  Menstrual cycles have not resumed.  Breastfeeding.  She has not resumed sexual intercourse.  Denies bowel or bladder issues.    PHYSICAL EXAM:    /86 (BP Location: Left arm, Patient Position: Sitting, Cuff Size: Adult)   Ht 170.2 cm (67\")   Wt 108 kg (237 lb)   LMP 2022 (Approximate)   Breastfeeding Yes   BMI 37.12 kg/m²   Abdomen: +BS, benign, no masses, soft, non-tender.  Incision: Well-healed, clean, dry, intact.  Extremities: No deep calf tenderness.  Postpartum Depression Screening Questionnaire: 3    IMPRESSION/PLAN: Marsha Damon is a 23 y.o.  s/p , Low Transverse on 3/7.  Doing well.  - Recovered nicely from her delivery  - Contraception: Declines  - Continue restrictions  - RTC 4 weeks for final PP visit  - Discussed recommendation waiting at least 1 year prior to conceiving due to uterine scar    This document has been electronically signed by Sara Adams MD on 2023 15:28 CDT.  "

## 2023-03-22 ENCOUNTER — PATIENT OUTREACH (OUTPATIENT)
Dept: CALL CENTER | Facility: HOSPITAL | Age: 23
End: 2023-03-22
Payer: COMMERCIAL

## 2023-03-22 NOTE — OUTREACH NOTE
Motherhood Connection Survey    Flowsheet Row Responses   [facility] facility patient discharged from? [place]   Week 1 attempt successful? No   Unsuccessful attempts Attempt 1   Reschedule Today            Sylvie RUIZ - Registered Nurse

## 2023-03-22 NOTE — OUTREACH NOTE
Motherhood Connection Survey    Flowsheet Row Responses   [facility] patient discharged from? [place]   Week 1 attempt successful? Yes   Call start time 1021   Call end time 1024   Baby sex Boy   [place] discharged home with mother? Yes   Baby sex Boy   Delivery type    Emotional state Acceptance   Family support Yes   Do you have all necessary resources to care for you and your baby?  Yes   Have members of your household adjusted to your baby? Yes   Did you have any problems with pre-eclampsia during this pregnancy? No   Did you have blood glucose issues during this pregnancy No   Lochia amount Light   Did you have an episiotomy/tear/abdominal incision? Yes   Additional comments Looks good   Feeding Method Breast   Frequency q 3 hours   Duration 2 ounces   Breast Condition No   Nipple Condition No   Signs baby is ready to eat Rooting, Finger sucking, Crying   Feeding tolerance Wet/dirty diapers   Number of wet diapers x 24 hours 8-10   Last BM x 24 hours 2-3   Umbilical Cord No reported signs or symptoms   Was the baby circumcised? Yes   Circumcision care and signs/symptoms to report Reviewed   Where does the baby usually sleep? Bassinet   Are there stuffed animals, toys, pillows, quilts, blankets, wedges, positioners, bumpers or other loose bedding in the infant's sleeping environment? No   Does the baby ever share a sleep surface in a bed, couch, recliner or other? No   What position do you lay your baby down to sleep? Back   Mom appointment comments: 3/21/23   Baby appointment comments: 3/24/23   Call completed? Yes   How satisfied were you with the Motherhood Connection Program? 5   Anyone you would like to recognize from your time in the Motherhood Connection Program All nurses were amazing.            Sylvie RUIZ - Registered Nurse

## 2023-04-25 ENCOUNTER — POSTPARTUM VISIT (OUTPATIENT)
Dept: OBSTETRICS AND GYNECOLOGY | Facility: CLINIC | Age: 23
End: 2023-04-25
Payer: COMMERCIAL

## 2023-04-25 PROBLEM — Z98.891 STATUS POST PRIMARY LOW TRANSVERSE CESAREAN SECTION: Status: RESOLVED | Noted: 2023-03-07 | Resolved: 2023-04-25

## 2023-04-25 PROBLEM — Z82.79 FAMILY HISTORY OF BIRTH DEFECT: Status: RESOLVED | Noted: 2022-09-27 | Resolved: 2023-04-25

## 2023-04-25 PROBLEM — O09.93 HIGH-RISK PREGNANCY IN THIRD TRIMESTER: Status: RESOLVED | Noted: 2022-09-27 | Resolved: 2023-04-25

## 2023-04-25 PROBLEM — D68.51 FACTOR V LEIDEN MUTATION COMPLICATING PREGNANCY: Status: RESOLVED | Noted: 2022-09-27 | Resolved: 2023-04-25

## 2023-04-25 PROBLEM — O99.119 FACTOR V LEIDEN MUTATION COMPLICATING PREGNANCY: Status: RESOLVED | Noted: 2022-09-27 | Resolved: 2023-04-25

## 2023-04-25 PROCEDURE — 0503F POSTPARTUM CARE VISIT: CPT | Performed by: OBSTETRICS & GYNECOLOGY

## 2023-04-25 NOTE — PROGRESS NOTES
"[facility]  Obstetrics  Date of Service: 2023     CC: Postpartum visit      Marsha Damon is a 23 y.o.  s/p , Low Transverse on 3/7/2023 at 35w5d secondary to PPROM, breech who presents today for postpartum check.  The patient states she is doing well.  Patient denies postpartum depression.  Menstrual cycles have not resumed.  Breastfeeding.  She has not resumed sexual intercourse.  Denies bowel or bladder issues.    PHYSICAL EXAM:    BP (P) 120/72 (BP Location: Left arm, Patient Position: Sitting, Cuff Size: Adult)   Ht (P) 170.2 cm (67\")   Wt (P) 106 kg (233 lb)   LMP 2022 (Approximate)   Breastfeeding Yes   BMI (P) 36.49 kg/m²   Abdomen: +BS, benign, no masses, soft, non-tender.  Incision: Well-healed, clean, dry, intact.  Extremities: No deep calf tenderness.  Postpartum Depression Screening Questionnaire: 7    IMPRESSION/PLAN: Marsha Damon is a 23 y.o.  s/p , Low Transverse on 3/7.  Doing well.  - Recovered nicely from her delivery  - Contraception: Declines  - Restrictions lifted  - RTC for annual w/ pap smear  - Discussed recommendation waiting at least 1 year prior to conceiving due to uterine scar    This document has been electronically signed by Sara Adams MD on 2023 14:20 CDT.  "

## 2023-05-08 ENCOUNTER — OFFICE VISIT (OUTPATIENT)
Dept: OBSTETRICS AND GYNECOLOGY | Facility: CLINIC | Age: 23
End: 2023-05-08
Payer: COMMERCIAL

## 2023-05-08 VITALS
BODY MASS INDEX: 35.94 KG/M2 | SYSTOLIC BLOOD PRESSURE: 122 MMHG | HEIGHT: 67 IN | WEIGHT: 229 LBS | DIASTOLIC BLOOD PRESSURE: 72 MMHG

## 2023-05-08 DIAGNOSIS — Z01.419 ENCOUNTER FOR WELL WOMAN EXAM WITH ROUTINE GYNECOLOGICAL EXAM: Primary | ICD-10-CM

## 2023-05-08 PROCEDURE — 99395 PREV VISIT EST AGE 18-39: CPT | Performed by: NURSE PRACTITIONER

## 2023-05-08 NOTE — PROGRESS NOTES
Subjective   Marsha Damon is a 23 y.o. Annual gynecological exam    History of Present Illness  LMP: postpartum, breastpumping  Pap: never  BC: none    Pt presents for annual gynecological exam with no complaints.    Gynecologic Exam  The patient's pertinent negatives include no genital itching, genital lesions, genital odor, genital rash, pelvic pain, vaginal bleeding or vaginal discharge. The patient is experiencing no pain. She is not pregnant. Pertinent negatives include no abdominal pain, chills, constipation, diarrhea, dysuria, fever, flank pain, frequency, headaches, hematuria, rash or urgency. She uses nothing for contraception. Her past medical history is significant for a  section.       The following portions of the patient's history were reviewed and updated as appropriate: allergies, current medications, past family history, past medical history, past social history, past surgical history and problem list.    Review of Systems   Constitutional: Negative for chills, diaphoresis, fatigue, fever and unexpected weight change.   Respiratory: Negative for apnea, chest tightness and shortness of breath.    Cardiovascular: Negative for chest pain and palpitations.   Gastrointestinal: Negative for abdominal distention, abdominal pain, constipation and diarrhea.   Genitourinary: Negative for decreased urine volume, difficulty urinating, dyspareunia, dysuria, enuresis, flank pain, frequency, genital sores, hematuria, menstrual problem, pelvic pain, urgency, vaginal bleeding, vaginal discharge and vaginal pain.   Skin: Negative for rash.   Neurological: Negative for headaches.   Psychiatric/Behavioral: Negative for sleep disturbance and suicidal ideas.         Objective   Physical Exam  Vitals and nursing note reviewed. Exam conducted with a chaperone present.   Constitutional:       General: She is awake. She is not in acute distress.     Appearance: Normal appearance. She is well-developed and  well-groomed. She is not ill-appearing, toxic-appearing or diaphoretic.   Neck:      Thyroid: No thyroid mass, thyromegaly or thyroid tenderness.   Cardiovascular:      Rate and Rhythm: Normal rate and regular rhythm.      Heart sounds: Normal heart sounds.   Pulmonary:      Effort: Pulmonary effort is normal.      Breath sounds: Normal breath sounds.   Chest:   Breasts:     Familia Score is 5.      Breasts are symmetrical.      Right: Normal. No swelling, bleeding, inverted nipple, mass, nipple discharge, skin change or tenderness.      Left: Normal. No swelling, bleeding, inverted nipple, mass, nipple discharge, skin change or tenderness.   Abdominal:      General: Bowel sounds are normal. There is no distension.      Palpations: Abdomen is soft.      Tenderness: There is no abdominal tenderness.   Genitourinary:     General: Normal vulva.      Exam position: Lithotomy position.      Familia stage (genital): 5.      Labia:         Right: No rash, tenderness, lesion or injury.         Left: No rash, tenderness, lesion or injury.       Urethra: No prolapse, urethral pain, urethral swelling or urethral lesion.      Cervix: Normal.      Comments: Severe vaginal atrophy 2/2 breast pumping.  Pt experienced great discomfort during positioning of speculum exam. Offered to discontinue pap smear and she desired this.    Lymphadenopathy:      Upper Body:      Right upper body: No supraclavicular, axillary or pectoral adenopathy.      Left upper body: No supraclavicular, axillary or pectoral adenopathy.      Lower Body: No right inguinal adenopathy. No left inguinal adenopathy.   Skin:     General: Skin is warm and dry.   Neurological:      Mental Status: She is alert and oriented to person, place, and time.      Gait: Gait is intact.   Psychiatric:         Attention and Perception: Attention and perception normal.         Mood and Affect: Mood and affect normal.         Speech: Speech normal.         Behavior: Behavior normal.  Behavior is cooperative.           Assessment & Plan   Diagnoses and all orders for this visit:    1. Encounter for well woman exam with routine gynecological exam (Primary)  -     Cancel: LIQUID-BASED PAP SMEAR, [facility] ([facility]); Future  -     Cancel: LIQUID-BASED PAP SMEAR, [facility] ([facility])      Patient educated and encouraged to do monthly self breast exam.  Offered vaginal estrogen cream then re- attempting pap smear in 6 weeks- she declines.  Pt considering discontinuing breast pumping soon and will return for pap smear after that.

## (undated) DEVICE — SUT  GUT PLAIN 2/0 CT1 27IN 843H

## (undated) DEVICE — GLV SURG SIGNATURE ESSENTIAL PF LTX SZ6.5

## (undated) DEVICE — SUT MNCRYL 3/0 Y936H

## (undated) DEVICE — GARMENT,MEDLINE,DVT,INT,CALF,MED, GEN2: Brand: MEDLINE

## (undated) DEVICE — SUT VIC 0 CT1 36IN J946H

## (undated) DEVICE — PK C/SECT 60

## (undated) DEVICE — SYS CLS SKIN PREMIERPRO EXOFINFUSION 22CM

## (undated) DEVICE — SHEET,DRAPE,53X77,STERILE: Brand: MEDLINE

## (undated) DEVICE — SUT MNCRYL 0/0 CTX 36IN Y398H

## (undated) DEVICE — STERILE POLYISOPRENE POWDER-FREE SURGICAL GLOVES WITH EMOLLIENT COATING: Brand: PROTEXIS

## (undated) DEVICE — TBG PENCL TELESCP MEGADYNE SMOKE EVAC 10FT